# Patient Record
Sex: MALE | Race: WHITE
[De-identification: names, ages, dates, MRNs, and addresses within clinical notes are randomized per-mention and may not be internally consistent; named-entity substitution may affect disease eponyms.]

---

## 2021-09-01 ENCOUNTER — HOSPITAL ENCOUNTER (INPATIENT)
Dept: HOSPITAL 19 - COL.ER | Age: 66
LOS: 9 days | Discharge: HOME HEALTH | DRG: 853 | End: 2021-09-10
Attending: SURGERY | Admitting: SURGERY
Payer: MEDICARE

## 2021-09-01 VITALS — OXYGEN SATURATION: 99 %

## 2021-09-01 VITALS — OXYGEN SATURATION: 84 %

## 2021-09-01 VITALS — OXYGEN SATURATION: 100 %

## 2021-09-01 VITALS — OXYGEN SATURATION: 98 %

## 2021-09-01 VITALS — OXYGEN SATURATION: 97 %

## 2021-09-01 VITALS
SYSTOLIC BLOOD PRESSURE: 91 MMHG | HEART RATE: 90 BPM | TEMPERATURE: 98.1 F | OXYGEN SATURATION: 98 % | DIASTOLIC BLOOD PRESSURE: 52 MMHG

## 2021-09-01 VITALS — SYSTOLIC BLOOD PRESSURE: 88 MMHG | DIASTOLIC BLOOD PRESSURE: 55 MMHG | TEMPERATURE: 98.1 F | HEART RATE: 91 BPM

## 2021-09-01 VITALS — DIASTOLIC BLOOD PRESSURE: 53 MMHG | HEART RATE: 92 BPM | SYSTOLIC BLOOD PRESSURE: 98 MMHG | TEMPERATURE: 98.2 F

## 2021-09-01 VITALS — HEIGHT: 65.98 IN | WEIGHT: 160.94 LBS | BODY MASS INDEX: 25.86 KG/M2

## 2021-09-01 VITALS — SYSTOLIC BLOOD PRESSURE: 88 MMHG | DIASTOLIC BLOOD PRESSURE: 56 MMHG | HEART RATE: 90 BPM | TEMPERATURE: 98.1 F

## 2021-09-01 VITALS — TEMPERATURE: 98.1 F | SYSTOLIC BLOOD PRESSURE: 84 MMHG | HEART RATE: 92 BPM | DIASTOLIC BLOOD PRESSURE: 56 MMHG

## 2021-09-01 VITALS
SYSTOLIC BLOOD PRESSURE: 88 MMHG | DIASTOLIC BLOOD PRESSURE: 56 MMHG | TEMPERATURE: 98.3 F | HEART RATE: 91 BPM | OXYGEN SATURATION: 99 %

## 2021-09-01 VITALS
SYSTOLIC BLOOD PRESSURE: 100 MMHG | OXYGEN SATURATION: 99 % | HEART RATE: 94 BPM | TEMPERATURE: 98 F | DIASTOLIC BLOOD PRESSURE: 54 MMHG

## 2021-09-01 VITALS — SYSTOLIC BLOOD PRESSURE: 89 MMHG | TEMPERATURE: 98.1 F | DIASTOLIC BLOOD PRESSURE: 55 MMHG | HEART RATE: 94 BPM

## 2021-09-01 DIAGNOSIS — K59.04: ICD-10-CM

## 2021-09-01 DIAGNOSIS — R73.9: ICD-10-CM

## 2021-09-01 DIAGNOSIS — R65.20: ICD-10-CM

## 2021-09-01 DIAGNOSIS — D64.9: ICD-10-CM

## 2021-09-01 DIAGNOSIS — E87.6: ICD-10-CM

## 2021-09-01 DIAGNOSIS — R06.6: ICD-10-CM

## 2021-09-01 DIAGNOSIS — K63.1: ICD-10-CM

## 2021-09-01 DIAGNOSIS — C18.7: ICD-10-CM

## 2021-09-01 DIAGNOSIS — A41.9: Primary | ICD-10-CM

## 2021-09-01 DIAGNOSIS — Z20.822: ICD-10-CM

## 2021-09-01 LAB
ALBUMIN SERPL-MCNC: 4.1 GM/DL (ref 3.5–5)
ALP SERPL-CCNC: 65 U/L (ref 50–136)
ALT SERPL-CCNC: 15 U/L (ref 4–49)
ANION GAP SERPL CALC-SCNC: 10 MMOL/L (ref 7–16)
ANION GAP SERPL CALC-SCNC: 6 MMOL/L (ref 7–16)
AST SERPL-CCNC: 22 U/L (ref 15–37)
BASE EXCESS BLDA CALC-SCNC: -2.3 MMOL/L (ref -2–2)
BASOPHILS # BLD: 0 10*3/UL (ref 0–0.2)
BASOPHILS NFR BLD AUTO: 0.2 % (ref 0–2)
BILIRUB SERPL-MCNC: 1.1 MG/DL (ref 0–1)
BUN SERPL-MCNC: 19 MG/DL (ref 9–20)
BUN SERPL-MCNC: 23 MG/DL (ref 9–20)
CALCIUM SERPL-MCNC: 8 MG/DL (ref 8.4–10.2)
CALCIUM SERPL-MCNC: 9.1 MG/DL (ref 8.4–10.2)
CHLORIDE SERPL-SCNC: 106 MMOL/L (ref 98–107)
CHLORIDE SERPL-SCNC: 108 MMOL/L (ref 98–107)
CO2 BLDA-SCNC: 22.5 MMOL/L
CO2 SERPL-SCNC: 22 MMOL/L (ref 22–30)
CO2 SERPL-SCNC: 23 MMOL/L (ref 22–30)
CREAT SERPL-SCNC: 0.77 UMOL/L (ref 0.66–1.25)
CREAT SERPL-SCNC: 0.81 UMOL/L (ref 0.66–1.25)
CRP SERPL-MCNC: 2.6 MG/DL (ref 0–0.9)
EOSINOPHIL # BLD: 0 10*3/UL (ref 0–0.7)
EOSINOPHIL NFR BLD: 0 % (ref 0–4)
ERYTHROCYTE [DISTWIDTH] IN BLOOD BY AUTOMATED COUNT: 14.1 % (ref 11.5–14.5)
ERYTHROCYTE [DISTWIDTH] IN BLOOD BY AUTOMATED COUNT: 14.4 % (ref 11.5–14.5)
GLUCOSE SERPL-MCNC: 163 MG/DL (ref 74–106)
GLUCOSE SERPL-MCNC: 167 MG/DL (ref 74–106)
GLUCOSE UR QL STRIP.AUTO: (no result)
GRANULOCYTES # BLD AUTO: 86.2 % (ref 42.2–75.2)
HCO3 BLDA-SCNC: 21.5 MEQ/L (ref 22–26)
HCT VFR BLD AUTO: 33.9 % (ref 42–52)
HCT VFR BLD AUTO: 40.6 % (ref 42–52)
HGB BLD-MCNC: 11 G/DL (ref 13.5–18)
HGB BLD-MCNC: 13.3 G/DL (ref 13.5–18)
KETONES UR STRIP.AUTO-MCNC: (no result) MG/DL
LIPASE SERPL-CCNC: 27 U/L (ref 23–300)
LYMPHOCYTES # BLD: 0.5 10*3/UL (ref 1.2–3.4)
LYMPHOCYTES NFR BLD MANUAL: 30 % (ref 20–51)
LYMPHOCYTES NFR BLD: 3.2 % (ref 20–51)
MCH RBC QN AUTO: 27 PG (ref 27–31)
MCH RBC QN AUTO: 27 PG (ref 27–31)
MCHC RBC AUTO-ENTMCNC: 32 G/DL (ref 33–37)
MCHC RBC AUTO-ENTMCNC: 33 G/DL (ref 33–37)
MCV RBC AUTO: 82 FL (ref 80–100)
MCV RBC AUTO: 83 FL (ref 80–100)
MONOCYTES # BLD: 1.5 10*3/UL (ref 0.1–0.6)
MONOCYTES NFR BLD AUTO: 10.1 % (ref 1.7–9.3)
NEUTROPHILS # BLD: 12.6 10*3/UL (ref 1.4–6.5)
NEUTS BAND NFR BLD: 6 % (ref 0–10)
NEUTS SEG NFR BLD MANUAL: 64 % (ref 42–75.2)
PCO2 BLDA: 33.7 MMHG (ref 35–45)
PH UR STRIP.AUTO: 5 [PH] (ref 5–8)
PLATELET # BLD AUTO: 205 K/MM3 (ref 130–400)
PLATELET # BLD AUTO: 221 K/MM3 (ref 130–400)
PLATELET BLD QL SMEAR: NORMAL
PMV BLD AUTO: 10.4 FL (ref 7.4–10.4)
PMV BLD AUTO: 10.4 FL (ref 7.4–10.4)
PO2 BLDA: 110.2 MMHG (ref 80–100)
POTASSIUM SERPL-SCNC: 4.1 MMOL/L (ref 3.4–5)
POTASSIUM SERPL-SCNC: 4.4 MMOL/L (ref 3.4–5)
PROT SERPL-MCNC: 7.2 GM/DL (ref 6.4–8.2)
RBC # BLD AUTO: 4.09 M/MM3 (ref 4.2–5.6)
RBC # BLD AUTO: 4.93 M/MM3 (ref 4.2–5.6)
RBC # UR STRIP.AUTO: (no result) /UL
RBC # UR: (no result) /HPF
SAO2 % BLDA: 97.9 % (ref 92–100)
SODIUM SERPL-SCNC: 137 MMOL/L (ref 137–145)
SODIUM SERPL-SCNC: 138 MMOL/L (ref 137–145)
SP GR UR STRIP.AUTO: 1.02 (ref 1–1.03)
SQUAMOUS # URNS: (no result) /HPF
UA DIPSTICK PNL UR STRIP.AUTO: (no result)
URN COLLECT METHOD CLASS: (no result)
UROBILINOGEN UR STRIP.AUTO-MCNC: >=4 MG/DL

## 2021-09-01 PROCEDURE — 0DTN0ZZ RESECTION OF SIGMOID COLON, OPEN APPROACH: ICD-10-PCS | Performed by: SURGERY

## 2021-09-01 PROCEDURE — C1751 CATH, INF, PER/CENT/MIDLINE: HCPCS

## 2021-09-01 PROCEDURE — 0D1N0Z4 BYPASS SIGMOID COLON TO CUTANEOUS, OPEN APPROACH: ICD-10-PCS | Performed by: SURGERY

## 2021-09-01 PROCEDURE — A4314 CATH W/DRAINAGE 2-WAY LATEX: HCPCS

## 2021-09-01 PROCEDURE — 05H633Z INSERTION OF INFUSION DEVICE INTO LEFT SUBCLAVIAN VEIN, PERCUTANEOUS APPROACH: ICD-10-PCS | Performed by: SURGERY

## 2021-09-01 PROCEDURE — A9284 NON-ELECTRONIC SPIROMETER: HCPCS

## 2021-09-01 NOTE — NUR
MR. ALLAN ARRIVE ON UNIT AT 1905 SLEEPING WITH DAUGHTER TO TRANSLATE AS THE
PATIENTS PRIMARY LANGUAGE IS Faroese. PATIENT APPEARED COMFORTABLE AND CALM.
PATIENT WAS ON 5 LPM OXYMASK FOR COMFORT. ON INSPECTION OF ALL INCISIONS AND
DRAINS, THE ABDOMEN HAD ABOUT TWO QUARTER SIZED DRAINAGE, THAT WERE MARKED AND
TIMED. THE MIDLINE INCISION IS COVERED WITH AN ABD PAD THAT IS CLEAN AND
SECURE ON ALL SIDES. PATIENT HAS A 14 FR TREVINO THAT IS DRAINING YELLOW CLEAR
URINE. THE PATIENT HAS A REINALDO DRAIN THAT IS DRAINING SCANT AMOUNT OF BLOOD
TINGED FLUID THAT IS NOT HAVING CLOTTING. ON THE LEFT SUBCLAVIAN THE PATIENT
HAS A TRIPPLE LUMEM CENTRAL LINE. THE COLOSTOMY BAG WAS NOT CHANGED AND HAS
SCANT AMOUNT OF DRAINAGE. PATIENT IS OTHERWISE HEALTHY AND WILL CONTINUE TO
MONITOR

## 2021-09-02 VITALS — OXYGEN SATURATION: 97 %

## 2021-09-02 VITALS — HEART RATE: 88 BPM | DIASTOLIC BLOOD PRESSURE: 46 MMHG | SYSTOLIC BLOOD PRESSURE: 95 MMHG | TEMPERATURE: 98.3 F

## 2021-09-02 VITALS — TEMPERATURE: 98.3 F | HEART RATE: 100 BPM | DIASTOLIC BLOOD PRESSURE: 58 MMHG | SYSTOLIC BLOOD PRESSURE: 102 MMHG

## 2021-09-02 VITALS — TEMPERATURE: 98.3 F | HEART RATE: 90 BPM | SYSTOLIC BLOOD PRESSURE: 106 MMHG | DIASTOLIC BLOOD PRESSURE: 61 MMHG

## 2021-09-02 VITALS — DIASTOLIC BLOOD PRESSURE: 57 MMHG | TEMPERATURE: 98.3 F | HEART RATE: 87 BPM | SYSTOLIC BLOOD PRESSURE: 96 MMHG

## 2021-09-02 VITALS — OXYGEN SATURATION: 98 %

## 2021-09-02 VITALS — OXYGEN SATURATION: 96 %

## 2021-09-02 VITALS — OXYGEN SATURATION: 99 %

## 2021-09-02 VITALS — TEMPERATURE: 98.1 F | HEART RATE: 92 BPM | DIASTOLIC BLOOD PRESSURE: 48 MMHG | SYSTOLIC BLOOD PRESSURE: 96 MMHG

## 2021-09-02 VITALS — OXYGEN SATURATION: 100 %

## 2021-09-02 LAB
ALBUMIN SERPL-MCNC: 2.7 GM/DL (ref 3.5–5)
ALP SERPL-CCNC: 34 U/L (ref 50–136)
ALT SERPL-CCNC: 13 U/L (ref 4–49)
ANION GAP SERPL CALC-SCNC: 4 MMOL/L (ref 7–16)
AST SERPL-CCNC: 19 U/L (ref 15–37)
BILIRUB SERPL-MCNC: 0.9 MG/DL (ref 0–1)
BUN SERPL-MCNC: 18 MG/DL (ref 9–20)
CALCIUM SERPL-MCNC: 7.5 MG/DL (ref 8.4–10.2)
CHLORIDE SERPL-SCNC: 111 MMOL/L (ref 98–107)
CO2 SERPL-SCNC: 25 MMOL/L (ref 22–30)
CREAT SERPL-SCNC: 0.74 UMOL/L (ref 0.66–1.25)
ERYTHROCYTE [DISTWIDTH] IN BLOOD BY AUTOMATED COUNT: 14.2 % (ref 11.5–14.5)
GLUCOSE SERPL-MCNC: 138 MG/DL (ref 74–106)
HCT VFR BLD AUTO: 30.8 % (ref 42–52)
HGB BLD-MCNC: 10 G/DL (ref 13.5–18)
HYPOCHROMIA BLD QL SMEAR: (no result)
LYMPHOCYTES NFR BLD MANUAL: 13 % (ref 20–51)
MCH RBC QN AUTO: 27 PG (ref 27–31)
MCHC RBC AUTO-ENTMCNC: 33 G/DL (ref 33–37)
MCV RBC AUTO: 84 FL (ref 80–100)
METAMYELOCYTES NFR BLD MANUAL: 3 % (ref 0–0)
MONOCYTES NFR BLD: 3 % (ref 1.7–9.3)
NEUTS BAND NFR BLD: 45 % (ref 0–10)
NEUTS SEG NFR BLD MANUAL: 36 % (ref 42–75.2)
PLATELET # BLD AUTO: 183 K/MM3 (ref 130–400)
PLATELET BLD QL SMEAR: NORMAL
PMV BLD AUTO: 10.5 FL (ref 7.4–10.4)
POTASSIUM SERPL-SCNC: 3.9 MMOL/L (ref 3.4–5)
PROT SERPL-MCNC: 5.2 GM/DL (ref 6.4–8.2)
RBC # BLD AUTO: 3.65 M/MM3 (ref 4.2–5.6)
SODIUM SERPL-SCNC: 139 MMOL/L (ref 137–145)

## 2021-09-03 VITALS — OXYGEN SATURATION: 97 %

## 2021-09-03 VITALS — SYSTOLIC BLOOD PRESSURE: 104 MMHG | DIASTOLIC BLOOD PRESSURE: 57 MMHG | HEART RATE: 88 BPM

## 2021-09-03 VITALS — DIASTOLIC BLOOD PRESSURE: 69 MMHG | TEMPERATURE: 99 F | HEART RATE: 95 BPM | SYSTOLIC BLOOD PRESSURE: 123 MMHG

## 2021-09-03 VITALS — SYSTOLIC BLOOD PRESSURE: 125 MMHG | TEMPERATURE: 99.3 F | HEART RATE: 92 BPM | DIASTOLIC BLOOD PRESSURE: 91 MMHG

## 2021-09-03 VITALS — HEART RATE: 90 BPM | SYSTOLIC BLOOD PRESSURE: 105 MMHG | TEMPERATURE: 98.1 F | DIASTOLIC BLOOD PRESSURE: 55 MMHG

## 2021-09-03 VITALS — DIASTOLIC BLOOD PRESSURE: 67 MMHG | HEART RATE: 83 BPM | TEMPERATURE: 98.6 F | SYSTOLIC BLOOD PRESSURE: 151 MMHG

## 2021-09-03 VITALS — DIASTOLIC BLOOD PRESSURE: 73 MMHG | SYSTOLIC BLOOD PRESSURE: 147 MMHG | TEMPERATURE: 98.6 F | HEART RATE: 95 BPM

## 2021-09-03 VITALS — OXYGEN SATURATION: 99 %

## 2021-09-03 VITALS — SYSTOLIC BLOOD PRESSURE: 119 MMHG | DIASTOLIC BLOOD PRESSURE: 82 MMHG | TEMPERATURE: 99.4 F | HEART RATE: 99 BPM

## 2021-09-03 VITALS — OXYGEN SATURATION: 91 %

## 2021-09-03 VITALS — OXYGEN SATURATION: 93 %

## 2021-09-03 VITALS — OXYGEN SATURATION: 94 %

## 2021-09-03 VITALS — OXYGEN SATURATION: 95 %

## 2021-09-03 VITALS — OXYGEN SATURATION: 92 %

## 2021-09-03 VITALS — OXYGEN SATURATION: 98 %

## 2021-09-03 VITALS — OXYGEN SATURATION: 96 %

## 2021-09-03 VITALS — OXYGEN SATURATION: 100 %

## 2021-09-03 VITALS — TEMPERATURE: 99.4 F

## 2021-09-03 LAB
ALBUMIN SERPL-MCNC: 2.9 GM/DL (ref 3.5–5)
ALP SERPL-CCNC: 41 U/L (ref 50–136)
ALT SERPL-CCNC: 13 U/L (ref 4–49)
ANION GAP SERPL CALC-SCNC: 5 MMOL/L (ref 7–16)
AST SERPL-CCNC: 24 U/L (ref 15–37)
BASOPHILS # BLD: 0 10*3/UL (ref 0–0.2)
BASOPHILS NFR BLD AUTO: 0.1 % (ref 0–2)
BILIRUB SERPL-MCNC: 0.2 MG/DL (ref 0–1)
BUN SERPL-MCNC: 21 MG/DL (ref 9–20)
CALCIUM SERPL-MCNC: 8.1 MG/DL (ref 8.4–10.2)
CHLORIDE SERPL-SCNC: 108 MMOL/L (ref 98–107)
CO2 SERPL-SCNC: 26 MMOL/L (ref 22–30)
CREAT SERPL-SCNC: 0.78 UMOL/L (ref 0.66–1.25)
EOSINOPHIL # BLD: 0 10*3/UL (ref 0–0.7)
EOSINOPHIL NFR BLD: 0 % (ref 0–4)
ERYTHROCYTE [DISTWIDTH] IN BLOOD BY AUTOMATED COUNT: 14.2 % (ref 11.5–14.5)
GLUCOSE SERPL-MCNC: 112 MG/DL (ref 74–106)
GRANULOCYTES # BLD AUTO: 76 % (ref 42.2–75.2)
HCT VFR BLD AUTO: 30.8 % (ref 42–52)
HGB BLD-MCNC: 10 G/DL (ref 13.5–18)
LYMPHOCYTES # BLD: 1.4 10*3/UL (ref 1.2–3.4)
LYMPHOCYTES NFR BLD: 13.8 % (ref 20–51)
MCH RBC QN AUTO: 27 PG (ref 27–31)
MCHC RBC AUTO-ENTMCNC: 33 G/DL (ref 33–37)
MCV RBC AUTO: 82 FL (ref 80–100)
MONOCYTES # BLD: 1 10*3/UL (ref 0.1–0.6)
MONOCYTES NFR BLD AUTO: 9.6 % (ref 1.7–9.3)
NEUTROPHILS # BLD: 7.7 10*3/UL (ref 1.4–6.5)
PLATELET # BLD AUTO: 214 K/MM3 (ref 130–400)
PMV BLD AUTO: 10.7 FL (ref 7.4–10.4)
POTASSIUM SERPL-SCNC: 4.1 MMOL/L (ref 3.4–5)
PROT SERPL-MCNC: 5.7 GM/DL (ref 6.4–8.2)
RBC # BLD AUTO: 3.74 M/MM3 (ref 4.2–5.6)
SODIUM SERPL-SCNC: 139 MMOL/L (ref 137–145)

## 2021-09-03 NOTE — NUR
Patient alert and oriented, answers questions appropriately.  See assessment.
Abdomen soft, rounded.  Bowel sounds hypoactive x4 quads. +Flatus.  C/o
nausea.  Midline incision with shadow drainage noted to dressing.  REINALDO to RLQ
compressed, serosanguinous drainage noted.  Left colostomy with stoma pink et
protruding, gas noted in bag.  Left subclavian triple lumen catheter in place,
flushes well with good blood return noted.  Spear catheter in place, patent,
draining clear yellow urine.  Epidural catheter in place to mid back, no
drainage at site, secured with tegaderm and foam tape.  Neuros intact to BLE,
no c/o numbness or tingling, pulses palpable.  No c/o at this time.

## 2021-09-03 NOTE — NUR
Awake, alert, oriented x 4, able to verbalize needs, abdominal dressing with
scant amount of previously marked drainage on dressing, REINALDO drain to R abdomen
with dressing c/d/i draining SS drainage, colostomy on L abdomen with dark
brown liquid- 15cc in bag, tinajero draining w/o issue with dependent drainage,
epidural to mid spine with dressing c/d/i with fentanyl infusing per orders,
patient denies numbness or tingling to BLE, moves BLE w/o difficulty,
respirations even and unlabored, skin warm and dry and intact, denies pain,
not tolerating diet at this time, c/o nausea- prn medication given by previous
nurse, will continue to monitor.

## 2021-09-04 VITALS — TEMPERATURE: 98.5 F | DIASTOLIC BLOOD PRESSURE: 58 MMHG | SYSTOLIC BLOOD PRESSURE: 122 MMHG | HEART RATE: 90 BPM

## 2021-09-04 VITALS — HEART RATE: 79 BPM | SYSTOLIC BLOOD PRESSURE: 138 MMHG | TEMPERATURE: 98.5 F | DIASTOLIC BLOOD PRESSURE: 61 MMHG

## 2021-09-04 VITALS — SYSTOLIC BLOOD PRESSURE: 107 MMHG | TEMPERATURE: 98.3 F | HEART RATE: 92 BPM | DIASTOLIC BLOOD PRESSURE: 45 MMHG

## 2021-09-04 VITALS — DIASTOLIC BLOOD PRESSURE: 67 MMHG | TEMPERATURE: 98.1 F | HEART RATE: 96 BPM | SYSTOLIC BLOOD PRESSURE: 122 MMHG

## 2021-09-04 VITALS — HEART RATE: 84 BPM | SYSTOLIC BLOOD PRESSURE: 120 MMHG | DIASTOLIC BLOOD PRESSURE: 58 MMHG | TEMPERATURE: 97.3 F

## 2021-09-04 VITALS — SYSTOLIC BLOOD PRESSURE: 113 MMHG | HEART RATE: 101 BPM | TEMPERATURE: 99 F | DIASTOLIC BLOOD PRESSURE: 54 MMHG

## 2021-09-04 VITALS — TEMPERATURE: 97.3 F

## 2021-09-04 LAB
ANION GAP SERPL CALC-SCNC: 6 MMOL/L (ref 7–16)
BASOPHILS # BLD: 0 10*3/UL (ref 0–0.2)
BASOPHILS NFR BLD AUTO: 0.2 % (ref 0–2)
BUN SERPL-MCNC: 21 MG/DL (ref 9–20)
CALCIUM SERPL-MCNC: 8 MG/DL (ref 8.4–10.2)
CHLORIDE SERPL-SCNC: 103 MMOL/L (ref 98–107)
CO2 SERPL-SCNC: 27 MMOL/L (ref 22–30)
CREAT SERPL-SCNC: 0.74 UMOL/L (ref 0.66–1.25)
EOSINOPHIL # BLD: 0 10*3/UL (ref 0–0.7)
EOSINOPHIL NFR BLD: 0.2 % (ref 0–4)
ERYTHROCYTE [DISTWIDTH] IN BLOOD BY AUTOMATED COUNT: 14 % (ref 11.5–14.5)
GLUCOSE SERPL-MCNC: 124 MG/DL (ref 74–106)
GRANULOCYTES # BLD AUTO: 79.4 % (ref 42.2–75.2)
HCT VFR BLD AUTO: 35.6 % (ref 42–52)
HGB BLD-MCNC: 11.5 G/DL (ref 13.5–18)
LYMPHOCYTES # BLD: 1.5 10*3/UL (ref 1.2–3.4)
LYMPHOCYTES NFR BLD: 11.5 % (ref 20–51)
MCH RBC QN AUTO: 27 PG (ref 27–31)
MCHC RBC AUTO-ENTMCNC: 32 G/DL (ref 33–37)
MCV RBC AUTO: 84 FL (ref 80–100)
MONOCYTES # BLD: 1 10*3/UL (ref 0.1–0.6)
MONOCYTES NFR BLD AUTO: 7.8 % (ref 1.7–9.3)
NEUTROPHILS # BLD: 10.1 10*3/UL (ref 1.4–6.5)
PLATELET # BLD AUTO: 279 K/MM3 (ref 130–400)
PMV BLD AUTO: 10.8 FL (ref 7.4–10.4)
POTASSIUM SERPL-SCNC: 3.6 MMOL/L (ref 3.4–5)
RBC # BLD AUTO: 4.25 M/MM3 (ref 4.2–5.6)
SODIUM SERPL-SCNC: 136 MMOL/L (ref 137–145)

## 2021-09-04 NOTE — NUR
Pt doing well.  Pt stated that he did have a little more pain this afternoon,
most likely from increase in activity today.  Informed anesthesia earlier that
epidural appeared to be leaking.  Pt does report that he does feel as if he
gets pain relief when he pushes the button

## 2021-09-04 NOTE — NUR
Plans to return home with Daughter Aubree Jean locally [phone].
SW met with patient and daughter in room. Patient does not speak english.
Romainian- Daughter Translated and spoke with patient and SW. DTR reports that
the patient resides with her and her  and the patients wife in the DTRs
home. DTR reprots that the patient has a son in [place] Francesco Aguila [phone]
[phone], who speaks english as well and is another EMR contact. Patient's PCP
is Dr. Stinson at [facility]. DTR denies patient has a DPOA,
any DME use, transport concerns, or medications issues. Patient obtains
medications from [facility] without difficulty. Patient reports that he is
currently in pain, 5/10 and is wanting to not have the catheter. Patient
reports wanting pain management. Dtr reports that the other question that she
has are for the Doctore and want proper education on how to keep father clean.
Eudcated on services with case management offered supports.

## 2021-09-04 NOTE — NUR
PT RESTING IN BED. SEE MAR FOR PAIN MED GIVEN. CHANGED ABD DRSG WITH GAUZE.
IRRIGATED COLOSTOMY WITH 100CC TAP H20 AND CLEAR RETURN NOTED. ABLE TO STEVE
MIRALAX IN APPLE JUICE. HAS CONSTANT HICCOUGHS.  USING IS UP TO 1000CC X5.
REINALDO DRAIN INTACT WITH VERY LITTLE DRG NOTED. PT VERY APPRECIATIVE OF CARES.  NO
OTHER NEEDS AT THIS TIME.

## 2021-09-04 NOTE — NUR
Pt resting in bed upon entering room.  He does have the hiccups which he
stated he gets after drinking anything.  He does spit up a little which is
clear.  Dressing to midline changed to airstrip, minimal drainage noted.
Dressing to REINALDO is CDI.  Burped ostomy bag, scant amount of liquid.  Irrigated
at this time with no return of stool.  Epidural is leaking, mid level
notified, will notify anesthesia.  Assisted pt to the chair, he did very well
with minimal complaints.  Pt made the comment that he is paying for what
happened, seemed very discouraged.  Educated to take it a day at a time and
take small steps.  Call light within reach

## 2021-09-04 NOTE — NUR
Anesthesia in to see patient, epidural assessed and dressing reinforced.  Pt
having complaints of pain to his right side, PRN pain medication given

## 2021-09-04 NOTE — NUR
Patient states he is "feeling better", no bowel movement noted, House
supervisor irrigated colostomy as ordered w/o results, patient is passing gas
through colostomy, no c/o at this time, will continue to monitor.

## 2021-09-04 NOTE — NUR
Patient attempting to get comfortable in bed, assisted with pillows, denies
pain, epidural dressing c/d/i w/o issue, respirations even and unlabored @16
per minute, denies numbness/tingling to ble, dressing to abdomen and REINALDO
drained changed per order- tolerated well, tinajero draining w/o issue, colostomy
bag intact. Denies further needs at this time.

## 2021-09-05 VITALS — HEART RATE: 87 BPM | SYSTOLIC BLOOD PRESSURE: 114 MMHG | DIASTOLIC BLOOD PRESSURE: 52 MMHG | TEMPERATURE: 98.1 F

## 2021-09-05 VITALS — HEART RATE: 93 BPM | TEMPERATURE: 98.2 F | DIASTOLIC BLOOD PRESSURE: 52 MMHG | SYSTOLIC BLOOD PRESSURE: 127 MMHG

## 2021-09-05 VITALS — HEART RATE: 77 BPM | SYSTOLIC BLOOD PRESSURE: 103 MMHG | TEMPERATURE: 99 F | DIASTOLIC BLOOD PRESSURE: 44 MMHG

## 2021-09-05 VITALS — SYSTOLIC BLOOD PRESSURE: 125 MMHG | HEART RATE: 86 BPM | TEMPERATURE: 98.4 F | DIASTOLIC BLOOD PRESSURE: 58 MMHG

## 2021-09-05 VITALS — DIASTOLIC BLOOD PRESSURE: 42 MMHG | HEART RATE: 97 BPM | SYSTOLIC BLOOD PRESSURE: 99 MMHG

## 2021-09-05 LAB
ALBUMIN SERPL-MCNC: 2.8 GM/DL (ref 3.5–5)
ALP SERPL-CCNC: 41 U/L (ref 50–136)
ALT SERPL-CCNC: 18 U/L (ref 4–49)
ANION GAP SERPL CALC-SCNC: 4 MMOL/L (ref 7–16)
AST SERPL-CCNC: 29 U/L (ref 15–37)
BASOPHILS # BLD: 0 10*3/UL (ref 0–0.2)
BASOPHILS NFR BLD AUTO: 0.1 % (ref 0–2)
BILIRUB SERPL-MCNC: 0.5 MG/DL (ref 0–1)
BUN SERPL-MCNC: 21 MG/DL (ref 9–20)
CALCIUM SERPL-MCNC: 7.7 MG/DL (ref 8.4–10.2)
CHLORIDE SERPL-SCNC: 104 MMOL/L (ref 98–107)
CO2 SERPL-SCNC: 27 MMOL/L (ref 22–30)
CREAT SERPL-SCNC: 0.69 UMOL/L (ref 0.66–1.25)
EOSINOPHIL # BLD: 0.1 10*3/UL (ref 0–0.7)
EOSINOPHIL NFR BLD: 1.3 % (ref 0–4)
ERYTHROCYTE [DISTWIDTH] IN BLOOD BY AUTOMATED COUNT: 13.8 % (ref 11.5–14.5)
GLUCOSE SERPL-MCNC: 116 MG/DL (ref 74–106)
GRANULOCYTES # BLD AUTO: 74.1 % (ref 42.2–75.2)
HCT VFR BLD AUTO: 30 % (ref 42–52)
HGB BLD-MCNC: 9.8 G/DL (ref 13.5–18)
LYMPHOCYTES # BLD: 1.4 10*3/UL (ref 1.2–3.4)
LYMPHOCYTES NFR BLD: 14.2 % (ref 20–51)
MAGNESIUM SERPL-MCNC: 2.1 MG/DL (ref 1.6–2.3)
MCH RBC QN AUTO: 27 PG (ref 27–31)
MCHC RBC AUTO-ENTMCNC: 33 G/DL (ref 33–37)
MCV RBC AUTO: 82 FL (ref 80–100)
MONOCYTES # BLD: 0.9 10*3/UL (ref 0.1–0.6)
MONOCYTES NFR BLD AUTO: 9.7 % (ref 1.7–9.3)
NEUTROPHILS # BLD: 7.1 10*3/UL (ref 1.4–6.5)
PLATELET # BLD AUTO: 258 K/MM3 (ref 130–400)
PMV BLD AUTO: 9.9 FL (ref 7.4–10.4)
POTASSIUM SERPL-SCNC: 3.3 MMOL/L (ref 3.4–5)
PROT SERPL-MCNC: 5.4 GM/DL (ref 6.4–8.2)
RBC # BLD AUTO: 3.67 M/MM3 (ref 4.2–5.6)
SODIUM SERPL-SCNC: 136 MMOL/L (ref 137–145)

## 2021-09-05 NOTE — NUR
Pt resting in bed upon entering room.  He is having the hiccups, but appears
more due to being full.  No output from ostomy and hypo to active bowel
sounds.  Dressing to midline changed.  No drainage noted, airstrip applied.
REINALDO to bulb suction, drsg CDI.  Ostomy irrigated per order, no output noted
during irrigation.  Epidural removed per order by BETHANY Chase.  Pt then
assisted up to the chair.  He did well with a one assist.

## 2021-09-05 NOTE — NUR
Discussed CT with Dr Briones.  Pt is sleeping a lot more today, but does wake
easily.  Daughter updated and just recently left to go home

## 2021-09-05 NOTE — NUR
Pt eating some yogurt at this time, refusing anything to drink.  States pain
is okay at this time.  Call light within reach

## 2021-09-05 NOTE — NUR
PT HAS SLEPT FAIRLY WELL THROUGH THE NIGHT. PAIN CONTROLLED WITH PERCOCET
TONIGHT. EPIDURAL IN PLACE. LOVENOX ON HOLD. MINIMAL DRAIAGE FROM COLOSTOMY
AND REINALDO DRAIN THIS SHIFT. TREVINO DRAINING FREELY. CALL LIGHT IN REACH.

## 2021-09-05 NOTE — NUR
Pt daughter has arrived.  Updated her on pt condition.  Daughter is concerned
about the amount of radiation her father is getting.  Assured her that tests
are ordered on a day by day basis and is only done if necessary.  All
questions answered.  Pt resting with his eyes closed, even non labored
breathing

## 2021-09-05 NOTE — NUR
GAVE ZOFRAN FOR C/O MILD NAUSEA.  STILL HAVING HICCOUGH WITH REFLUX. HOB
ELEVATED. PT VOIDED 250CC CLEAR YELLOW URINE PER URINAL.

## 2021-09-05 NOTE — NUR
NOTED FLATUS IN COLOSTOMY BAG.  IRRIGATED WITH 100CC TAP H20. STEVE WELL.
IMMEDIATE RETURN OF FLUID AND MINIMAL GAS. ZOFRAN HELPED NAUSEA.

## 2021-09-05 NOTE — NUR
Assisted pt back to bed, pt reported feeling tired, wanting to rest.  Spear
catheter removed.  Pt had complaints of pain, PRN pain medication given.  Pt
given some juice as he stated he was getting thirsty.  Once pt back in bed, pt
appeared very tired and did fall asleep quickly.  He did wake easily for VS,
but then quickly fell back asleep.  Will continue to monitor

## 2021-09-06 VITALS — TEMPERATURE: 98.5 F | DIASTOLIC BLOOD PRESSURE: 48 MMHG | SYSTOLIC BLOOD PRESSURE: 109 MMHG | HEART RATE: 86 BPM

## 2021-09-06 VITALS — HEART RATE: 88 BPM | SYSTOLIC BLOOD PRESSURE: 118 MMHG | DIASTOLIC BLOOD PRESSURE: 84 MMHG | TEMPERATURE: 98.2 F

## 2021-09-06 VITALS — DIASTOLIC BLOOD PRESSURE: 66 MMHG | TEMPERATURE: 98.2 F | SYSTOLIC BLOOD PRESSURE: 131 MMHG | HEART RATE: 76 BPM

## 2021-09-06 VITALS — SYSTOLIC BLOOD PRESSURE: 116 MMHG | HEART RATE: 83 BPM | DIASTOLIC BLOOD PRESSURE: 81 MMHG | TEMPERATURE: 97.4 F

## 2021-09-06 VITALS — DIASTOLIC BLOOD PRESSURE: 55 MMHG | TEMPERATURE: 99.2 F | HEART RATE: 80 BPM | SYSTOLIC BLOOD PRESSURE: 117 MMHG

## 2021-09-06 VITALS — DIASTOLIC BLOOD PRESSURE: 49 MMHG | HEART RATE: 94 BPM | TEMPERATURE: 98.5 F | SYSTOLIC BLOOD PRESSURE: 132 MMHG

## 2021-09-06 VITALS — DIASTOLIC BLOOD PRESSURE: 57 MMHG | HEART RATE: 84 BPM | TEMPERATURE: 97.6 F | SYSTOLIC BLOOD PRESSURE: 112 MMHG

## 2021-09-06 LAB
ANION GAP SERPL CALC-SCNC: 5 MMOL/L (ref 7–16)
BASOPHILS # BLD: 0 10*3/UL (ref 0–0.2)
BASOPHILS NFR BLD AUTO: 0.3 % (ref 0–2)
BUN SERPL-MCNC: 14 MG/DL (ref 9–20)
CALCIUM SERPL-MCNC: 7.9 MG/DL (ref 8.4–10.2)
CHLORIDE SERPL-SCNC: 104 MMOL/L (ref 98–107)
CO2 SERPL-SCNC: 27 MMOL/L (ref 22–30)
CREAT SERPL-SCNC: 0.61 UMOL/L (ref 0.66–1.25)
EOSINOPHIL # BLD: 0.2 10*3/UL (ref 0–0.7)
EOSINOPHIL NFR BLD: 2.3 % (ref 0–4)
ERYTHROCYTE [DISTWIDTH] IN BLOOD BY AUTOMATED COUNT: 14 % (ref 11.5–14.5)
GLUCOSE SERPL-MCNC: 94 MG/DL (ref 74–106)
GRANULOCYTES # BLD AUTO: 61.5 % (ref 42.2–75.2)
HCT VFR BLD AUTO: 30.4 % (ref 42–52)
HGB BLD-MCNC: 9.9 G/DL (ref 13.5–18)
LYMPHOCYTES # BLD: 2.3 10*3/UL (ref 1.2–3.4)
LYMPHOCYTES NFR BLD: 23.4 % (ref 20–51)
MCH RBC QN AUTO: 26 PG (ref 27–31)
MCHC RBC AUTO-ENTMCNC: 33 G/DL (ref 33–37)
MCV RBC AUTO: 81 FL (ref 80–100)
MONOCYTES # BLD: 1.1 10*3/UL (ref 0.1–0.6)
MONOCYTES NFR BLD AUTO: 11.4 % (ref 1.7–9.3)
NEUTROPHILS # BLD: 5.9 10*3/UL (ref 1.4–6.5)
PLATELET # BLD AUTO: 301 K/MM3 (ref 130–400)
PMV BLD AUTO: 9.8 FL (ref 7.4–10.4)
POTASSIUM SERPL-SCNC: 3.6 MMOL/L (ref 3.4–5)
RBC # BLD AUTO: 3.76 M/MM3 (ref 4.2–5.6)
SODIUM SERPL-SCNC: 136 MMOL/L (ref 137–145)

## 2021-09-06 NOTE — NUR
Informed Dr Briones about the output from the REINALDO being a thicker consistency and
more white in color.  Dr Briones also aware that pt is not taking in much oral
intake.  He does better with pudding and yogurt consistency.  Pt doing well
sitting up in the chair and does appear in better spirits today than
yesterday.

## 2021-09-06 NOTE — NUR
IRRIGATED COLOSTOMY WITH RETURN OF BROWN LIQUID. DID CHANGE WAFER AND BAG AT
THIS TIME. CHANGED DRSG TO MIDLINE D/T DRAINAGE, NOTED 4 TELFA RISHABH IN PLACE
WITH STAPLES NOTED. PT TAKES HS MEDS WITHOUT PROBLEM. DRANK 120CC OF CRANBERRY
JUICE W/MIRALAX. LEFT TRIPLE LUMEN CENTRAL LINE PATENT, LUMENS FLUSHED.

## 2021-09-06 NOTE — NUR
PT CONTINUES WITH HICCOUGHS AND BELCHING REFLUX. NOTED AIR IN COLOSTOMY BAG.
STOMA CONTINUES TO BE PINK.

## 2021-09-06 NOTE — NUR
Assisted pt up to the chair.  He did well with one assist.  Pt is starting to
have liquid output from his ostomy.  REINALDO output is thick and whitish yellow,
had to strip tubing to get it cleared out.  Pt stated that he is okay and does
not need pain medication.

## 2021-09-06 NOTE — NUR
PT HAS BEEN SLEEPING WELL. REPOSITIONS SELF FREQ FOR COMFORT. NO DRAINAGE
NOTED FROM COLOSTOMY EXCEPT FOR OLD BLOODY DRG.

## 2021-09-06 NOTE — NUR
Pt only ate part of a vanilla yogurt and a couple bites of applesauce all day.
 He is not able to drink any liquids as he feels it makes him feel sick.  Pt
is now having liquid output from his ostomy.  Worked with daughter some on
ostomy care.  Pt did do well transferring to the chair, but it weak.  Pain is
tolerable.  No needs at this time, call light within reach

## 2021-09-07 VITALS — HEART RATE: 79 BPM | DIASTOLIC BLOOD PRESSURE: 59 MMHG | SYSTOLIC BLOOD PRESSURE: 128 MMHG | TEMPERATURE: 98.3 F

## 2021-09-07 VITALS — SYSTOLIC BLOOD PRESSURE: 139 MMHG | DIASTOLIC BLOOD PRESSURE: 59 MMHG | TEMPERATURE: 98.4 F | HEART RATE: 75 BPM

## 2021-09-07 VITALS — TEMPERATURE: 99.1 F | SYSTOLIC BLOOD PRESSURE: 94 MMHG | DIASTOLIC BLOOD PRESSURE: 56 MMHG | HEART RATE: 91 BPM

## 2021-09-07 VITALS — HEART RATE: 82 BPM | SYSTOLIC BLOOD PRESSURE: 113 MMHG | TEMPERATURE: 99.2 F | DIASTOLIC BLOOD PRESSURE: 54 MMHG

## 2021-09-07 VITALS — DIASTOLIC BLOOD PRESSURE: 60 MMHG | TEMPERATURE: 98.4 F | HEART RATE: 81 BPM | SYSTOLIC BLOOD PRESSURE: 116 MMHG

## 2021-09-07 VITALS — SYSTOLIC BLOOD PRESSURE: 120 MMHG | HEART RATE: 86 BPM | TEMPERATURE: 97.8 F | DIASTOLIC BLOOD PRESSURE: 51 MMHG

## 2021-09-07 LAB
ANION GAP SERPL CALC-SCNC: 5 MMOL/L (ref 7–16)
BUN SERPL-MCNC: 12 MG/DL (ref 9–20)
CALCIUM SERPL-MCNC: 8.2 MG/DL (ref 8.4–10.2)
CHLORIDE SERPL-SCNC: 105 MMOL/L (ref 98–107)
CO2 SERPL-SCNC: 26 MMOL/L (ref 22–30)
CREAT SERPL-SCNC: 0.6 UMOL/L (ref 0.66–1.25)
EOSINOPHIL NFR BLD: 3 % (ref 0–4)
ERYTHROCYTE [DISTWIDTH] IN BLOOD BY AUTOMATED COUNT: 14 % (ref 11.5–14.5)
GLUCOSE SERPL-MCNC: 99 MG/DL (ref 74–106)
HCT VFR BLD AUTO: 32.2 % (ref 42–52)
HGB BLD-MCNC: 10.6 G/DL (ref 13.5–18)
LYMPHOCYTES NFR BLD MANUAL: 19 % (ref 20–51)
MCH RBC QN AUTO: 26 PG (ref 27–31)
MCHC RBC AUTO-ENTMCNC: 33 G/DL (ref 33–37)
MCV RBC AUTO: 80 FL (ref 80–100)
MONOCYTES NFR BLD: 10 % (ref 1.7–9.3)
NEUTS BAND NFR BLD: 11 % (ref 0–10)
NEUTS SEG NFR BLD MANUAL: 57 % (ref 42–75.2)
PLATELET # BLD AUTO: 320 K/MM3 (ref 130–400)
PLATELET BLD QL SMEAR: NORMAL
PMV BLD AUTO: 9.8 FL (ref 7.4–10.4)
POTASSIUM SERPL-SCNC: 3.9 MMOL/L (ref 3.4–5)
RBC # BLD AUTO: 4.02 M/MM3 (ref 4.2–5.6)
SODIUM SERPL-SCNC: 136 MMOL/L (ref 137–145)

## 2021-09-07 NOTE — NUR
educated patient on the recommendation of HH. Patient agrees to
this but states that his daughter needs to pick the facility. Attempt to
contact the patients daughter and was unsuccessful. Message left for her that
i need to speak with her about HH agencies. Nursing notified to contact me
with the patients daughter arrives.

## 2021-09-07 NOTE — NUR
PT IN BED. TAKES HS MEDS WITHOUT PROBLEM. IRRIGATED COLOSTOMY WITH SOAP SUDS
WATER WITH RED AWA CATHETER, RETURN OF 300CC OF BROWN SEDIMENT FILLED STOOL.
CHANGED OSTOMY APPLIANCE AND BAG AT THIS TIME. REMOVED REINALDO DRAIN PER DR ORDER,
PLACED 4X4 GAUZE OVER SITE. REMOVED 4 RISHABH FROM MIDLINE INCISION. STAPLES
INTACT. DID PLACE STERI STRIPS OVER OLD WICK SITES AS THERE WAS SOME GAPPING
NOTED. [place] DRSG PLACED OVER MIDLINE. VOIDING PER URINAL WITHOUT
PROBLEM. HAS LEFT TLC WITH IVF INFUSING WITHOUT REDNESS OR SWELLING, GOOD
BLOOD RETURN NOTED FROM ALL 3 LUMENS. PT TURNING SIDE TO SIDE ON HIS OWN.
MINIMAL HICCUPS NOTED.

## 2021-09-07 NOTE — NUR
Patient is getting around better today. He has some pain to his abdomen with
coughing and movement. He did not want to take pills most the day due to
getting hiccups every time he eats or drinks. They only last for a few minutes
afterwards and than go away. He stated he has had problems with that in the
past. No complaints of nausea. Discussed with about how to care for colostomy
and he just shook his hand at me, saying no. He has no interest in caring for
his colostomy on his own. Discussed this with the family to make them aware
someone will need to help him and the daughter said her  will be the
one to help. She asked if he can come up on the day he discharges to see how
to change it. Spoke with supervisor Renee and she that would be ok. No other
changes at this time. Call light within reach.

## 2021-09-07 NOTE — NUR
Patients daughter educated on the need for HH.  provided
Medicare.gov list of agencies that offer HH services in the area. Patient
lives at home with her wife, daughter, son in law and grandson. Patients son
in law works from home and his daughter works at [employer], but with her work
schedule is flexible. Daughter is going to review the list of HH agencies with
her family and call to notify the nurse of their choice.

## 2021-09-07 NOTE — NUR
Attempted to irrigate colostomy as ordered, did not get stool returned. Just
liquid. Patient stated he feels full all the time. dressing to ostomy site
intact. No other changes at this time. Call light within reach.

## 2021-09-08 VITALS — TEMPERATURE: 99.4 F | HEART RATE: 79 BPM | DIASTOLIC BLOOD PRESSURE: 44 MMHG | SYSTOLIC BLOOD PRESSURE: 101 MMHG

## 2021-09-08 VITALS — SYSTOLIC BLOOD PRESSURE: 106 MMHG | HEART RATE: 86 BPM | TEMPERATURE: 98.2 F | DIASTOLIC BLOOD PRESSURE: 47 MMHG

## 2021-09-08 VITALS — TEMPERATURE: 99 F | SYSTOLIC BLOOD PRESSURE: 96 MMHG | DIASTOLIC BLOOD PRESSURE: 48 MMHG | HEART RATE: 92 BPM

## 2021-09-08 VITALS — HEART RATE: 87 BPM | DIASTOLIC BLOOD PRESSURE: 52 MMHG | SYSTOLIC BLOOD PRESSURE: 98 MMHG | TEMPERATURE: 98.7 F

## 2021-09-08 LAB
ANION GAP SERPL CALC-SCNC: 7 MMOL/L (ref 7–16)
BASOPHILS # BLD: 0 10*3/UL (ref 0–0.2)
BASOPHILS NFR BLD AUTO: 0.3 % (ref 0–2)
BUN SERPL-MCNC: 15 MG/DL (ref 9–20)
CALCIUM SERPL-MCNC: 8.2 MG/DL (ref 8.4–10.2)
CHLORIDE SERPL-SCNC: 103 MMOL/L (ref 98–107)
CO2 SERPL-SCNC: 26 MMOL/L (ref 22–30)
CREAT SERPL-SCNC: 0.61 UMOL/L (ref 0.66–1.25)
EOSINOPHIL # BLD: 0.2 10*3/UL (ref 0–0.7)
EOSINOPHIL NFR BLD: 2.3 % (ref 0–4)
ERYTHROCYTE [DISTWIDTH] IN BLOOD BY AUTOMATED COUNT: 14.2 % (ref 11.5–14.5)
GLUCOSE SERPL-MCNC: 106 MG/DL (ref 74–106)
GRANULOCYTES # BLD AUTO: 66.5 % (ref 42.2–75.2)
HCT VFR BLD AUTO: 32.2 % (ref 42–52)
HGB BLD-MCNC: 10.3 G/DL (ref 13.5–18)
LYMPHOCYTES # BLD: 1.7 10*3/UL (ref 1.2–3.4)
LYMPHOCYTES NFR BLD: 18.2 % (ref 20–51)
MCH RBC QN AUTO: 26 PG (ref 27–31)
MCHC RBC AUTO-ENTMCNC: 32 G/DL (ref 33–37)
MCV RBC AUTO: 83 FL (ref 80–100)
MONOCYTES # BLD: 1.1 10*3/UL (ref 0.1–0.6)
MONOCYTES NFR BLD AUTO: 11.5 % (ref 1.7–9.3)
NEUTROPHILS # BLD: 6.2 10*3/UL (ref 1.4–6.5)
PLATELET # BLD AUTO: 352 K/MM3 (ref 130–400)
PMV BLD AUTO: 9.8 FL (ref 7.4–10.4)
POTASSIUM SERPL-SCNC: 3.4 MMOL/L (ref 3.4–5)
RBC # BLD AUTO: 3.9 M/MM3 (ref 4.2–5.6)
SODIUM SERPL-SCNC: 135 MMOL/L (ref 137–145)

## 2021-09-08 NOTE — NUR
Patient has been doing better today. He is starting to have more liquid stool
returned and is passing flatus. Irrigated colostomy as ordered, brown liquid
returned. He is eating better and denies nausea. He is tolerating intake
better. He is getting around with a walker much better today. Patient
tolerated the irrigation without issues. No other changes at this time. Call
light wihtin reach.

## 2021-09-08 NOTE — NUR
Rhoda had pt complete medicare about you rights. IM completed 9/8 11:29 am.
Rhoda put in his chart and gave copy to RHODA palmer.

## 2021-09-08 NOTE — NUR
Sitting up in chair watching TV visiting with daughter who helped with
translation. Tolerated supper tray-ate 100%. Denies pain/nausea/shortness of
breath. VS stable. Dressing to midline abdomen with small amount of old
drainate. Colostomy to left side with scant amount of brown liquid stool. Plan
of care discussed for this shift to include medications/enema/calling for
questions/concerns. Verbalizes understanding.  WIll monitor.

## 2021-09-09 VITALS — DIASTOLIC BLOOD PRESSURE: 53 MMHG | SYSTOLIC BLOOD PRESSURE: 118 MMHG | TEMPERATURE: 98.4 F | HEART RATE: 72 BPM

## 2021-09-09 VITALS — HEART RATE: 84 BPM | TEMPERATURE: 97.6 F | DIASTOLIC BLOOD PRESSURE: 62 MMHG | SYSTOLIC BLOOD PRESSURE: 120 MMHG

## 2021-09-09 VITALS — SYSTOLIC BLOOD PRESSURE: 113 MMHG | TEMPERATURE: 98.6 F | HEART RATE: 71 BPM | DIASTOLIC BLOOD PRESSURE: 44 MMHG

## 2021-09-09 VITALS — DIASTOLIC BLOOD PRESSURE: 51 MMHG | HEART RATE: 75 BPM | SYSTOLIC BLOOD PRESSURE: 110 MMHG | TEMPERATURE: 98 F

## 2021-09-09 VITALS — DIASTOLIC BLOOD PRESSURE: 62 MMHG | HEART RATE: 74 BPM | TEMPERATURE: 98.2 F | SYSTOLIC BLOOD PRESSURE: 120 MMHG

## 2021-09-09 VITALS — DIASTOLIC BLOOD PRESSURE: 63 MMHG | HEART RATE: 76 BPM | SYSTOLIC BLOOD PRESSURE: 126 MMHG | TEMPERATURE: 98.4 F

## 2021-09-09 VITALS — TEMPERATURE: 98.3 F | HEART RATE: 92 BPM | DIASTOLIC BLOOD PRESSURE: 46 MMHG | SYSTOLIC BLOOD PRESSURE: 115 MMHG

## 2021-09-09 LAB
ANION GAP SERPL CALC-SCNC: 6 MMOL/L (ref 7–16)
BASOPHILS # BLD: 0 10*3/UL (ref 0–0.2)
BASOPHILS NFR BLD AUTO: 0.3 % (ref 0–2)
BUN SERPL-MCNC: 16 MG/DL (ref 9–20)
CALCIUM SERPL-MCNC: 8.3 MG/DL (ref 8.4–10.2)
CHLORIDE SERPL-SCNC: 103 MMOL/L (ref 98–107)
CO2 SERPL-SCNC: 27 MMOL/L (ref 22–30)
CREAT SERPL-SCNC: 0.73 UMOL/L (ref 0.66–1.25)
EOSINOPHIL # BLD: 0.2 10*3/UL (ref 0–0.7)
EOSINOPHIL NFR BLD: 1.7 % (ref 0–4)
ERYTHROCYTE [DISTWIDTH] IN BLOOD BY AUTOMATED COUNT: 14.3 % (ref 11.5–14.5)
GLUCOSE SERPL-MCNC: 104 MG/DL (ref 74–106)
GRANULOCYTES # BLD AUTO: 64.9 % (ref 42.2–75.2)
HCT VFR BLD AUTO: 32.3 % (ref 42–52)
HGB BLD-MCNC: 10.5 G/DL (ref 13.5–18)
LYMPHOCYTES # BLD: 2.4 10*3/UL (ref 1.2–3.4)
LYMPHOCYTES NFR BLD: 22.5 % (ref 20–51)
MCH RBC QN AUTO: 27 PG (ref 27–31)
MCHC RBC AUTO-ENTMCNC: 33 G/DL (ref 33–37)
MCV RBC AUTO: 82 FL (ref 80–100)
MONOCYTES # BLD: 1 10*3/UL (ref 0.1–0.6)
MONOCYTES NFR BLD AUTO: 9.8 % (ref 1.7–9.3)
NEUTROPHILS # BLD: 6.8 10*3/UL (ref 1.4–6.5)
PLATELET # BLD AUTO: 390 K/MM3 (ref 130–400)
PMV BLD AUTO: 9.8 FL (ref 7.4–10.4)
POTASSIUM SERPL-SCNC: 3.3 MMOL/L (ref 3.4–5)
RBC # BLD AUTO: 3.95 M/MM3 (ref 4.2–5.6)
SODIUM SERPL-SCNC: 136 MMOL/L (ref 137–145)

## 2021-09-09 NOTE — NUR
Patient has been doing ok today. He has some pain ealier in the shift, tylenol
given by the student nurse and patient has been doing well since. Denies
nausea today. He has been eating better. His liquid stool output has increased
today. Student nurse was able to irrigate the colostomy as ordered with soap
suds and water. His daughter was her this afternoon and asked several
questions about discharge. Answered her questions as best as possible.
Explained she needs to speak with the Doctor for some of her questions. She is
upset because she keeps missing the docotr. No other changes at this time.
Call light within reach.

## 2021-09-09 NOTE — NUR
Soap suds enema complete at this time. NOted to have 100mls of brown liquid in
colostomy prior to enema. Emptied at this time.

## 2021-09-10 VITALS — DIASTOLIC BLOOD PRESSURE: 59 MMHG | HEART RATE: 77 BPM | TEMPERATURE: 98.3 F | SYSTOLIC BLOOD PRESSURE: 106 MMHG

## 2021-09-10 VITALS — HEART RATE: 80 BPM | SYSTOLIC BLOOD PRESSURE: 124 MMHG | DIASTOLIC BLOOD PRESSURE: 54 MMHG | TEMPERATURE: 97.9 F

## 2021-09-10 VITALS — HEART RATE: 75 BPM | SYSTOLIC BLOOD PRESSURE: 102 MMHG | DIASTOLIC BLOOD PRESSURE: 42 MMHG | TEMPERATURE: 98.3 F

## 2021-09-10 LAB
ANION GAP SERPL CALC-SCNC: 8 MMOL/L (ref 7–16)
BASOPHILS # BLD: 0 10*3/UL (ref 0–0.2)
BASOPHILS NFR BLD AUTO: 0.3 % (ref 0–2)
BUN SERPL-MCNC: 20 MG/DL (ref 9–20)
CALCIUM SERPL-MCNC: 8.5 MG/DL (ref 8.4–10.2)
CHLORIDE SERPL-SCNC: 106 MMOL/L (ref 98–107)
CO2 SERPL-SCNC: 25 MMOL/L (ref 22–30)
CREAT SERPL-SCNC: 0.73 UMOL/L (ref 0.66–1.25)
EOSINOPHIL # BLD: 0.1 10*3/UL (ref 0–0.7)
EOSINOPHIL NFR BLD: 0.9 % (ref 0–4)
ERYTHROCYTE [DISTWIDTH] IN BLOOD BY AUTOMATED COUNT: 14.1 % (ref 11.5–14.5)
GLUCOSE SERPL-MCNC: 111 MG/DL (ref 74–106)
GRANULOCYTES # BLD AUTO: 71.8 % (ref 42.2–75.2)
HCT VFR BLD AUTO: 30.3 % (ref 42–52)
HGB BLD-MCNC: 10.1 G/DL (ref 13.5–18)
LYMPHOCYTES # BLD: 1.7 10*3/UL (ref 1.2–3.4)
LYMPHOCYTES NFR BLD: 17.1 % (ref 20–51)
MCH RBC QN AUTO: 27 PG (ref 27–31)
MCHC RBC AUTO-ENTMCNC: 33 G/DL (ref 33–37)
MCV RBC AUTO: 80 FL (ref 80–100)
MONOCYTES # BLD: 0.9 10*3/UL (ref 0.1–0.6)
MONOCYTES NFR BLD AUTO: 9.1 % (ref 1.7–9.3)
NEUTROPHILS # BLD: 7.2 10*3/UL (ref 1.4–6.5)
PLATELET # BLD AUTO: 371 K/MM3 (ref 130–400)
PMV BLD AUTO: 9.2 FL (ref 7.4–10.4)
POTASSIUM SERPL-SCNC: 3.8 MMOL/L (ref 3.4–5)
RBC # BLD AUTO: 3.8 M/MM3 (ref 4.2–5.6)
SODIUM SERPL-SCNC: 139 MMOL/L (ref 137–145)

## 2021-09-10 NOTE — NUR
Discontinued central line from left chest. Patient tolerated well. The
catheter had a blunt end, no fraying noted. Central line site clean and dry,
no reddness noted. This nurse held pressure for 15mins with gauze. Fresh gauze
and tegaderm placed to site. Explained to patient and his daughter he has to
lay flat for 30mins. Patient tolerated procedure well.
 
After removing the catheter, the daughter stated she was
going to the bathroom and she started to fall against the wall outside the
room. She than fell and passed out. She was unresponsive for less than 30
seconds. Her  is at bedside as well. We got her up to a wheel chair.
She did not hit her head and denies any injuries. She slid against the wall to
the ground. She is alert and oriented. She was able to walk to couch and sit
down. Her  is with her at this time.

## 2021-09-10 NOTE — NUR
Patient is discharging home. Discussed discharge instructions with family and
patient. His daughter had to help interpret for patient. The patients
son-in-law stated it was all overwhelming. Answered all the questions that I
was able to answer. Gave them a lot of education to read over. The home health
nurse will see the patient at home tomorrow. Gave them two appliances and 2
bags to go home with incase they need to change it. Explained how to measure
the site. Explained how and how often to drain the pouch. Explained
medications he is discharging home with. Copies of discharge instructions
given to patient. Explained to keep dressing to central line site for 24-48
hours. Explained lifting restrictions. Patient had his walker delivered from
[employer] [facility]. No other changes at this time. All belongings packed up
and sent with patient. Patient walked out via wheel chair.

## 2021-09-10 NOTE — NUR
Student nurse flushed colostomy as ordered. Patient has been doing well this
morning. Discussed the plan for possible discharge today with his daughter.
She will be up once he has orders with her . He will be the one to care
for the colostomy site and help to drain. No other changes at this time. Call
light within reach.

## 2021-11-01 NOTE — NUR
SW: Adela, verified with Jenny at [facility] that someone will meet with them
tomorrow 09/11 and faxed D/C orders. No

## 2022-01-05 ENCOUNTER — HOSPITAL ENCOUNTER (EMERGENCY)
Dept: HOSPITAL 19 - COL.ER | Age: 67
Discharge: HOME | End: 2022-01-05
Attending: EMERGENCY MEDICINE
Payer: MEDICARE

## 2022-01-05 VITALS — BODY MASS INDEX: 27.39 KG/M2 | WEIGHT: 170.42 LBS | HEIGHT: 65.98 IN

## 2022-01-05 VITALS — HEART RATE: 80 BPM | SYSTOLIC BLOOD PRESSURE: 122 MMHG | DIASTOLIC BLOOD PRESSURE: 61 MMHG

## 2022-01-05 VITALS — TEMPERATURE: 98 F

## 2022-01-05 DIAGNOSIS — M54.9: Primary | ICD-10-CM

## 2022-01-05 DIAGNOSIS — R91.8: ICD-10-CM

## 2022-01-05 LAB
ANION GAP SERPL CALC-SCNC: 12 MMOL/L (ref 7–16)
BUN SERPL-MCNC: 18 MG/DL (ref 8–26)
CALCIUM SERPL-MCNC: 9.7 MG/DL (ref 8.4–10.2)
CHLORIDE SERPL-SCNC: 107 MMOL/L (ref 98–107)
CO2 SERPL-SCNC: 22 MMOL/L (ref 23–31)
CREAT SERPL-SCNC: 0.8 MG/DL (ref 0.72–1.25)
GLUCOSE SERPL-MCNC: 103 MG/DL (ref 70–99)
PH UR STRIP.AUTO: 6 [PH] (ref 5–8)
POTASSIUM SERPL-SCNC: 4 MMOL/L (ref 3.5–4.5)
RBC # UR: (no result) /HPF (ref 0–2)
SODIUM SERPL-SCNC: 141 MMOL/L (ref 136–145)
SP GR UR STRIP.AUTO: 1.01 (ref 1–1.03)
URN COLLECT METHOD CLASS: (no result)

## 2022-02-07 ENCOUNTER — HOSPITAL ENCOUNTER (EMERGENCY)
Dept: HOSPITAL 19 - COL.ER | Age: 67
Discharge: HOME | End: 2022-02-07
Attending: EMERGENCY MEDICINE
Payer: MEDICARE

## 2022-02-07 VITALS — HEIGHT: 65.98 IN | WEIGHT: 165.35 LBS | BODY MASS INDEX: 26.57 KG/M2

## 2022-02-07 VITALS — TEMPERATURE: 98.1 F | DIASTOLIC BLOOD PRESSURE: 79 MMHG | HEART RATE: 78 BPM | SYSTOLIC BLOOD PRESSURE: 132 MMHG

## 2022-02-07 DIAGNOSIS — F17.220: ICD-10-CM

## 2022-02-07 DIAGNOSIS — C79.9: ICD-10-CM

## 2022-02-07 DIAGNOSIS — K59.00: Primary | ICD-10-CM

## 2022-02-07 DIAGNOSIS — C18.9: ICD-10-CM

## 2022-02-07 LAB
ALBUMIN SERPL-MCNC: 3.7 GM/DL (ref 3.4–4.8)
ALP SERPL-CCNC: 79 U/L (ref 40–150)
ALT SERPL-CCNC: 13 U/L (ref 0–55)
ANION GAP SERPL CALC-SCNC: 10 MMOL/L (ref 7–16)
AST SERPL-CCNC: 16 U/L (ref 5–34)
BASOPHILS # BLD: 0 K/MM3 (ref 0–0.2)
BASOPHILS NFR BLD AUTO: 0.6 % (ref 0–2)
BILIRUB SERPL-MCNC: 0.3 MG/DL (ref 0.2–1.2)
BUN SERPL-MCNC: 14 MG/DL (ref 8–26)
CALCIUM SERPL-MCNC: 9.4 MG/DL (ref 8.4–10.2)
CHLORIDE SERPL-SCNC: 108 MMOL/L (ref 98–107)
CO2 SERPL-SCNC: 23 MMOL/L (ref 23–31)
CREAT SERPL-SCNC: 0.76 MG/DL (ref 0.72–1.25)
EOSINOPHIL # BLD: 0.1 K/MM3 (ref 0–0.7)
EOSINOPHIL NFR BLD: 1.6 % (ref 0–4)
ERYTHROCYTE [DISTWIDTH] IN BLOOD BY AUTOMATED COUNT: 17.4 % (ref 11.5–14.5)
GLUCOSE SERPL-MCNC: 107 MG/DL (ref 70–99)
GRANULOCYTES # BLD AUTO: 53.5 % (ref 42.2–75.2)
HCT VFR BLD AUTO: 38.1 % (ref 42–52)
HGB BLD-MCNC: 12.3 G/DL (ref 13.5–18)
LYMPHOCYTES # BLD: 2.1 K/MM3 (ref 1.2–3.4)
LYMPHOCYTES NFR BLD: 33.7 % (ref 20–51)
MCH RBC QN AUTO: 25 PG (ref 27–31)
MCHC RBC AUTO-ENTMCNC: 32 G/DL (ref 33–37)
MCV RBC AUTO: 76 FL (ref 80–100)
MONOCYTES # BLD: 0.6 K/MM3 (ref 0.1–0.6)
MONOCYTES NFR BLD AUTO: 10.3 % (ref 1.7–9.3)
NEUTROPHILS # BLD: 3.3 K/MM3 (ref 1.4–6.5)
PLATELET # BLD AUTO: 264 K/MM3 (ref 130–400)
PMV BLD AUTO: 9.6 FL (ref 7.4–10.4)
POTASSIUM SERPL-SCNC: 4.1 MMOL/L (ref 3.5–4.5)
PROT SERPL-MCNC: 7.5 GM/DL (ref 6.2–8.1)
RBC # BLD AUTO: 4.99 M/MM3 (ref 4.2–5.6)
SODIUM SERPL-SCNC: 141 MMOL/L (ref 136–145)

## 2022-02-24 ENCOUNTER — HOSPITAL ENCOUNTER (OUTPATIENT)
Dept: HOSPITAL 19 - EUO | Age: 67
LOS: 4 days | End: 2022-02-28
Payer: MEDICARE

## 2022-02-24 VITALS — HEART RATE: 80 BPM | DIASTOLIC BLOOD PRESSURE: 64 MMHG | TEMPERATURE: 97.8 F | SYSTOLIC BLOOD PRESSURE: 123 MMHG

## 2022-02-24 DIAGNOSIS — C18.7: Primary | ICD-10-CM

## 2022-02-24 LAB
ALBUMIN SERPL-MCNC: 3.6 GM/DL (ref 3.4–4.8)
ALP SERPL-CCNC: 60 U/L (ref 40–150)
ALT SERPL-CCNC: 9 U/L (ref 0–55)
ANION GAP SERPL CALC-SCNC: 11 MMOL/L (ref 7–16)
ANISOCYTOSIS BLD QL: (no result)
AST SERPL-CCNC: 20 U/L (ref 5–34)
BILIRUB SERPL-MCNC: 0.4 MG/DL (ref 0.2–1.2)
BUN SERPL-MCNC: 21 MG/DL (ref 8–26)
CALCIUM SERPL-MCNC: 9 MG/DL (ref 8.4–10.2)
CHLORIDE SERPL-SCNC: 107 MMOL/L (ref 98–107)
CO2 SERPL-SCNC: 22 MMOL/L (ref 23–31)
CREAT SERPL-SCNC: 0.75 MG/DL (ref 0.72–1.25)
EOSINOPHIL NFR BLD: 5 % (ref 0–4)
ERYTHROCYTE [DISTWIDTH] IN BLOOD BY AUTOMATED COUNT: 17.3 % (ref 11.5–14.5)
GLUCOSE SERPL-MCNC: 110 MG/DL (ref 70–99)
HCT VFR BLD AUTO: 37.9 % (ref 42–52)
HGB BLD-MCNC: 12.3 G/DL (ref 13.5–18)
HYPOCHROMIA BLD QL SMEAR: (no result)
LYMPHOCYTES NFR BLD MANUAL: 33 % (ref 20–51)
MCH RBC QN AUTO: 25 PG (ref 27–31)
MCHC RBC AUTO-ENTMCNC: 33 G/DL (ref 33–37)
MCV RBC AUTO: 77 FL (ref 80–100)
MONOCYTES NFR BLD: 14 % (ref 1.7–9.3)
NEUTS BAND NFR BLD: 2 % (ref 0–10)
NEUTS SEG NFR BLD MANUAL: 46 % (ref 42–75.2)
PLATELET # BLD AUTO: 207 K/MM3 (ref 130–400)
PLATELET BLD QL SMEAR: NORMAL
PMV BLD AUTO: 9.9 FL (ref 7.4–10.4)
POTASSIUM SERPL-SCNC: 3.9 MMOL/L (ref 3.5–4.5)
PROT SERPL-MCNC: 7 GM/DL (ref 6.2–8.1)
RBC # BLD AUTO: 4.9 M/MM3 (ref 4.2–5.6)
SODIUM SERPL-SCNC: 140 MMOL/L (ref 136–145)

## 2022-02-24 PROCEDURE — C1751 CATH, INF, PER/CENT/MIDLINE: HCPCS

## 2022-02-24 NOTE — NUR
Pt assisted out by wheelchair to daughter's car. He will plan to return
Tuesday mornings for PICC cares and labs.

## 2022-03-01 VITALS — TEMPERATURE: 98.3 F | DIASTOLIC BLOOD PRESSURE: 66 MMHG | SYSTOLIC BLOOD PRESSURE: 118 MMHG | HEART RATE: 82 BPM

## 2022-03-03 VITALS — DIASTOLIC BLOOD PRESSURE: 56 MMHG | TEMPERATURE: 98.1 F | SYSTOLIC BLOOD PRESSURE: 100 MMHG | HEART RATE: 72 BPM

## 2022-03-08 NOTE — NUR
PICC dressing change done on 3/90612 with arm measurement 32, insertion site
marking at 0. No signs or symptoms of IV complications noted. Sterile dressing
change done with insertion site cleansed with chloraprep x 1, CHG dressing
applied, skin prep, stat lock, and tegaderm applied.

## 2022-03-10 VITALS — HEART RATE: 92 BPM | TEMPERATURE: 97.4 F | SYSTOLIC BLOOD PRESSURE: 118 MMHG | DIASTOLIC BLOOD PRESSURE: 64 MMHG

## 2022-03-10 LAB
ALBUMIN SERPL-MCNC: 3.9 GM/DL (ref 3.4–4.8)
ALP SERPL-CCNC: 60 U/L (ref 40–150)
ALT SERPL-CCNC: 17 U/L (ref 0–55)
ANION GAP SERPL CALC-SCNC: 9 MMOL/L (ref 7–16)
ANISOCYTOSIS BLD QL: (no result)
AST SERPL-CCNC: 17 U/L (ref 5–34)
BILIRUB SERPL-MCNC: 0.2 MG/DL (ref 0.2–1.2)
BUN SERPL-MCNC: 20 MG/DL (ref 8–26)
CALCIUM SERPL-MCNC: 9.1 MG/DL (ref 8.4–10.2)
CHLORIDE SERPL-SCNC: 105 MMOL/L (ref 98–107)
CO2 SERPL-SCNC: 23 MMOL/L (ref 23–31)
CREAT SERPL-SCNC: 0.8 MG/DL (ref 0.72–1.25)
EOSINOPHIL NFR BLD: 2 % (ref 0–4)
ERYTHROCYTE [DISTWIDTH] IN BLOOD BY AUTOMATED COUNT: 17.8 % (ref 11.5–14.5)
GLUCOSE SERPL-MCNC: 117 MG/DL (ref 70–99)
HCT VFR BLD AUTO: 38.2 % (ref 42–52)
HGB BLD-MCNC: 12.7 G/DL (ref 13.5–18)
LYMPHOCYTES NFR BLD MANUAL: 32 % (ref 20–51)
MCH RBC QN AUTO: 26 PG (ref 27–31)
MCHC RBC AUTO-ENTMCNC: 33 G/DL (ref 33–37)
MCV RBC AUTO: 78 FL (ref 80–100)
MICROCYTES BLD QL SMEAR: (no result)
MONOCYTES NFR BLD: 6 % (ref 1.7–9.3)
NEUTS SEG NFR BLD MANUAL: 60 % (ref 42–75.2)
PLATELET # BLD AUTO: 214 K/MM3 (ref 130–400)
PLATELET BLD QL SMEAR: NORMAL
PMV BLD AUTO: 9.6 FL (ref 7.4–10.4)
POTASSIUM SERPL-SCNC: 3.8 MMOL/L (ref 3.5–4.5)
PROT SERPL-MCNC: 7 GM/DL (ref 6.2–8.1)
RBC # BLD AUTO: 4.93 M/MM3 (ref 4.2–5.6)
SODIUM SERPL-SCNC: 137 MMOL/L (ref 136–145)

## 2022-03-17 VITALS — SYSTOLIC BLOOD PRESSURE: 108 MMHG | HEART RATE: 68 BPM | DIASTOLIC BLOOD PRESSURE: 65 MMHG | TEMPERATURE: 97.6 F

## 2022-03-17 NOTE — NUR
Here for PICC cares.  PICC intact.  Patient's right upper arm PICC dressing
change done with sterile technique.  Insertion site was cleansed with
ChloraPrep x1, skin prep, StatLock, and Tegaderm applied.  No signs or
symptoms of IV complications noted.  Patient to return next week.  Patient
voiced understanding of instructions.

## 2022-03-24 VITALS — HEART RATE: 59 BPM | DIASTOLIC BLOOD PRESSURE: 66 MMHG | TEMPERATURE: 98.2 F | SYSTOLIC BLOOD PRESSURE: 115 MMHG

## 2022-03-24 LAB
ALBUMIN SERPL-MCNC: 3.7 GM/DL (ref 3.4–4.8)
ALP SERPL-CCNC: 73 U/L (ref 40–150)
ALT SERPL-CCNC: 15 U/L (ref 0–55)
ANION GAP SERPL CALC-SCNC: 10 MMOL/L (ref 7–16)
AST SERPL-CCNC: 15 U/L (ref 5–34)
BILIRUB SERPL-MCNC: 0.2 MG/DL (ref 0.2–1.2)
BUN SERPL-MCNC: 20 MG/DL (ref 8–26)
CALCIUM SERPL-MCNC: 8.9 MG/DL (ref 8.4–10.2)
CHLORIDE SERPL-SCNC: 108 MMOL/L (ref 98–107)
CO2 SERPL-SCNC: 22 MMOL/L (ref 23–31)
CREAT SERPL-SCNC: 0.81 MG/DL (ref 0.72–1.25)
EOSINOPHIL NFR BLD: 4 % (ref 0–4)
ERYTHROCYTE [DISTWIDTH] IN BLOOD BY AUTOMATED COUNT: 17.6 % (ref 11.5–14.5)
GLUCOSE SERPL-MCNC: 142 MG/DL (ref 70–99)
HCT VFR BLD AUTO: 36.3 % (ref 42–52)
HGB BLD-MCNC: 12.1 G/DL (ref 13.5–18)
LYMPHOCYTES NFR BLD MANUAL: 36 % (ref 20–51)
MCH RBC QN AUTO: 26 PG (ref 27–31)
MCHC RBC AUTO-ENTMCNC: 33 G/DL (ref 33–37)
MCV RBC AUTO: 78 FL (ref 80–100)
MONOCYTES NFR BLD: 11 % (ref 1.7–9.3)
NEUTS SEG NFR BLD MANUAL: 49 % (ref 42–75.2)
OVALOCYTES BLD QL SMEAR: (no result)
PLATELET # BLD AUTO: 189 K/MM3 (ref 130–400)
PLATELET BLD QL SMEAR: NORMAL
PMV BLD AUTO: 9.2 FL (ref 7.4–10.4)
POLYCHROMASIA BLD QL SMEAR: (no result)
POTASSIUM SERPL-SCNC: 3.6 MMOL/L (ref 3.5–4.5)
PROT SERPL-MCNC: 6.8 GM/DL (ref 6.2–8.1)
RBC # BLD AUTO: 4.64 M/MM3 (ref 4.2–5.6)
SODIUM SERPL-SCNC: 140 MMOL/L (ref 136–145)

## 2022-03-31 ENCOUNTER — HOSPITAL ENCOUNTER (OUTPATIENT)
Dept: HOSPITAL 19 - EUO | Age: 67
Discharge: HOME | End: 2022-03-31
Payer: MEDICARE

## 2022-03-31 VITALS — SYSTOLIC BLOOD PRESSURE: 88 MMHG | HEART RATE: 76 BPM | DIASTOLIC BLOOD PRESSURE: 51 MMHG | TEMPERATURE: 97.8 F

## 2022-03-31 VITALS — HEIGHT: 65.98 IN | WEIGHT: 166.89 LBS | BODY MASS INDEX: 26.82 KG/M2

## 2022-03-31 DIAGNOSIS — C18.9: Primary | ICD-10-CM

## 2022-04-07 VITALS — SYSTOLIC BLOOD PRESSURE: 111 MMHG | HEART RATE: 77 BPM | TEMPERATURE: 97.6 F | DIASTOLIC BLOOD PRESSURE: 60 MMHG

## 2022-04-14 VITALS — HEART RATE: 86 BPM | TEMPERATURE: 98.4 F | DIASTOLIC BLOOD PRESSURE: 80 MMHG | SYSTOLIC BLOOD PRESSURE: 121 MMHG

## 2022-04-14 LAB
ALBUMIN SERPL-MCNC: 3.7 GM/DL (ref 3.4–4.8)
ALP SERPL-CCNC: 84 U/L (ref 40–150)
ALT SERPL-CCNC: 14 U/L (ref 0–55)
ANION GAP SERPL CALC-SCNC: 10 MMOL/L (ref 7–16)
ANISOCYTOSIS BLD QL: (no result)
AST SERPL-CCNC: 16 U/L (ref 5–34)
BASOPHILS NFR BLD MANUAL: 1 % (ref 0–2)
BILIRUB SERPL-MCNC: 0.3 MG/DL (ref 0.2–1.2)
BUN SERPL-MCNC: 26 MG/DL (ref 8–26)
CALCIUM SERPL-MCNC: 9.2 MG/DL (ref 8.4–10.2)
CHLORIDE SERPL-SCNC: 108 MMOL/L (ref 98–107)
CO2 SERPL-SCNC: 22 MMOL/L (ref 23–31)
CREAT SERPL-SCNC: 0.93 MG/DL (ref 0.72–1.25)
EOSINOPHIL NFR BLD: 1 % (ref 0–4)
ERYTHROCYTE [DISTWIDTH] IN BLOOD BY AUTOMATED COUNT: 19.1 % (ref 11.5–14.5)
GLUCOSE SERPL-MCNC: 140 MG/DL (ref 70–99)
HCT VFR BLD AUTO: 36.4 % (ref 42–52)
HGB BLD-MCNC: 12.5 G/DL (ref 13.5–18)
LYMPHOCYTES NFR BLD MANUAL: 34 % (ref 20–51)
MCH RBC QN AUTO: 27 PG (ref 27–31)
MCHC RBC AUTO-ENTMCNC: 34 G/DL (ref 33–37)
MCV RBC AUTO: 79 FL (ref 80–100)
MICROCYTES BLD QL SMEAR: (no result)
MONOCYTES NFR BLD: 13 % (ref 1.7–9.3)
NEUTS SEG NFR BLD MANUAL: 51 % (ref 42–75.2)
PLATELET # BLD AUTO: 172 K/MM3 (ref 130–400)
PLATELET BLD QL SMEAR: NORMAL
PMV BLD AUTO: 9.3 FL (ref 7.4–10.4)
POTASSIUM SERPL-SCNC: 3.7 MMOL/L (ref 3.5–4.5)
PROT SERPL-MCNC: 6.6 GM/DL (ref 6.2–8.1)
RBC # BLD AUTO: 4.63 M/MM3 (ref 4.2–5.6)
SODIUM SERPL-SCNC: 140 MMOL/L (ref 136–145)

## 2022-04-21 VITALS — SYSTOLIC BLOOD PRESSURE: 109 MMHG | DIASTOLIC BLOOD PRESSURE: 49 MMHG | HEART RATE: 74 BPM | TEMPERATURE: 98.2 F

## 2022-04-28 ENCOUNTER — HOSPITAL ENCOUNTER (OUTPATIENT)
Dept: HOSPITAL 19 - EUO | Age: 67
LOS: 2 days | Discharge: HOME | End: 2022-04-30
Payer: MEDICARE

## 2022-04-28 VITALS — DIASTOLIC BLOOD PRESSURE: 69 MMHG | TEMPERATURE: 98.9 F | HEART RATE: 70 BPM | SYSTOLIC BLOOD PRESSURE: 124 MMHG

## 2022-04-28 VITALS — BODY MASS INDEX: 27.71 KG/M2 | HEIGHT: 65.98 IN | WEIGHT: 172.4 LBS

## 2022-04-28 DIAGNOSIS — C18.7: Primary | ICD-10-CM

## 2022-05-05 VITALS — TEMPERATURE: 98.1 F | SYSTOLIC BLOOD PRESSURE: 116 MMHG | DIASTOLIC BLOOD PRESSURE: 79 MMHG | HEART RATE: 82 BPM

## 2022-05-05 LAB
ALBUMIN SERPL-MCNC: 3.9 GM/DL (ref 3.4–4.8)
ALP SERPL-CCNC: 123 U/L (ref 40–150)
ALT SERPL-CCNC: 13 U/L (ref 0–55)
ANION GAP SERPL CALC-SCNC: 11 MMOL/L (ref 7–16)
ANISOCYTOSIS BLD QL: (no result)
AST SERPL-CCNC: 17 U/L (ref 5–34)
BILIRUB SERPL-MCNC: 0.5 MG/DL (ref 0.2–1.2)
BUN SERPL-MCNC: 22 MG/DL (ref 8–26)
CALCIUM SERPL-MCNC: 8.8 MG/DL (ref 8.4–10.2)
CHLORIDE SERPL-SCNC: 108 MMOL/L (ref 98–107)
CO2 SERPL-SCNC: 21 MMOL/L (ref 23–31)
CREAT SERPL-SCNC: 0.91 MG/DL (ref 0.72–1.25)
EOSINOPHIL NFR BLD: 1 % (ref 0–4)
ERYTHROCYTE [DISTWIDTH] IN BLOOD BY AUTOMATED COUNT: 21.2 % (ref 11.5–14.5)
GLUCOSE SERPL-MCNC: 115 MG/DL (ref 70–99)
HCT VFR BLD AUTO: 37.4 % (ref 42–52)
HGB BLD-MCNC: 12.5 G/DL (ref 13.5–18)
LYMPHOCYTES NFR BLD MANUAL: 37 % (ref 20–51)
MCH RBC QN AUTO: 28 PG (ref 27–31)
MCHC RBC AUTO-ENTMCNC: 33 G/DL (ref 33–37)
MCV RBC AUTO: 83 FL (ref 80–100)
MONOCYTES NFR BLD: 16 % (ref 1.7–9.3)
NEUTS BAND NFR BLD: 6 % (ref 0–10)
NEUTS SEG NFR BLD MANUAL: 40 % (ref 42–75.2)
PLATELET # BLD AUTO: 161 K/MM3 (ref 130–400)
PLATELET BLD QL SMEAR: NORMAL
PMV BLD AUTO: 9.2 FL (ref 7.4–10.4)
POTASSIUM SERPL-SCNC: 4.1 MMOL/L (ref 3.5–4.5)
PROT SERPL-MCNC: 6.7 GM/DL (ref 6.2–8.1)
RBC # BLD AUTO: 4.51 M/MM3 (ref 4.2–5.6)
SODIUM SERPL-SCNC: 140 MMOL/L (ref 136–145)

## 2022-05-12 VITALS — DIASTOLIC BLOOD PRESSURE: 57 MMHG | HEART RATE: 83 BPM | SYSTOLIC BLOOD PRESSURE: 121 MMHG | TEMPERATURE: 98.4 F

## 2022-05-19 VITALS — TEMPERATURE: 97.8 F | DIASTOLIC BLOOD PRESSURE: 51 MMHG | SYSTOLIC BLOOD PRESSURE: 110 MMHG | HEART RATE: 89 BPM

## 2022-05-24 VITALS — SYSTOLIC BLOOD PRESSURE: 124 MMHG | DIASTOLIC BLOOD PRESSURE: 60 MMHG | HEART RATE: 87 BPM | TEMPERATURE: 97.7 F

## 2022-05-24 LAB
ALBUMIN SERPL-MCNC: 3.7 GM/DL (ref 3.4–4.8)
ALP SERPL-CCNC: 106 U/L (ref 40–150)
ALT SERPL-CCNC: 21 U/L (ref 0–55)
ANION GAP SERPL CALC-SCNC: 11 MMOL/L (ref 7–16)
ANISOCYTOSIS BLD QL: (no result)
AST SERPL-CCNC: 19 U/L (ref 5–34)
BILIRUB SERPL-MCNC: 0.8 MG/DL (ref 0.2–1.2)
BUN SERPL-MCNC: 19 MG/DL (ref 8–26)
CALCIUM SERPL-MCNC: 9.2 MG/DL (ref 8.4–10.2)
CHLORIDE SERPL-SCNC: 108 MMOL/L (ref 98–107)
CO2 SERPL-SCNC: 21 MMOL/L (ref 23–31)
CREAT SERPL-SCNC: 0.76 MG/DL (ref 0.72–1.25)
ERYTHROCYTE [DISTWIDTH] IN BLOOD BY AUTOMATED COUNT: 22 % (ref 11.5–14.5)
GLUCOSE SERPL-MCNC: 138 MG/DL (ref 70–99)
HCT VFR BLD AUTO: 36.4 % (ref 42–52)
HGB BLD-MCNC: 12.4 G/DL (ref 13.5–18)
LYMPHOCYTES NFR BLD MANUAL: 43 % (ref 20–51)
MCH RBC QN AUTO: 29 PG (ref 27–31)
MCHC RBC AUTO-ENTMCNC: 34 G/DL (ref 33–37)
MCV RBC AUTO: 86 FL (ref 80–100)
MONOCYTES NFR BLD: 22 % (ref 1.7–9.3)
NEUTS SEG NFR BLD MANUAL: 35 % (ref 42–75.2)
PLATELET # BLD AUTO: 141 K/MM3 (ref 130–400)
PLATELET BLD QL SMEAR: NORMAL
PMV BLD AUTO: 10 FL (ref 7.4–10.4)
POTASSIUM SERPL-SCNC: 3.6 MMOL/L (ref 3.5–4.5)
PROT SERPL-MCNC: 6.3 GM/DL (ref 6.2–8.1)
RBC # BLD AUTO: 4.24 M/MM3 (ref 4.2–5.6)
SODIUM SERPL-SCNC: 140 MMOL/L (ref 136–145)

## 2022-05-26 ENCOUNTER — HOSPITAL ENCOUNTER (OUTPATIENT)
Dept: HOSPITAL 19 - EUO | Age: 67
LOS: 5 days | Discharge: HOME | End: 2022-05-31
Payer: MEDICARE

## 2022-05-26 VITALS — BODY MASS INDEX: 28.81 KG/M2 | WEIGHT: 179.24 LBS | HEIGHT: 65.98 IN

## 2022-05-26 VITALS — SYSTOLIC BLOOD PRESSURE: 138 MMHG | HEART RATE: 95 BPM | DIASTOLIC BLOOD PRESSURE: 77 MMHG | TEMPERATURE: 97.3 F

## 2022-05-26 DIAGNOSIS — C18.7: Primary | ICD-10-CM

## 2022-06-02 VITALS — SYSTOLIC BLOOD PRESSURE: 111 MMHG | DIASTOLIC BLOOD PRESSURE: 66 MMHG | TEMPERATURE: 98.7 F | HEART RATE: 55 BPM

## 2022-06-09 VITALS — DIASTOLIC BLOOD PRESSURE: 62 MMHG | SYSTOLIC BLOOD PRESSURE: 126 MMHG | HEART RATE: 88 BPM | TEMPERATURE: 97.9 F

## 2022-06-16 VITALS — SYSTOLIC BLOOD PRESSURE: 105 MMHG | HEART RATE: 87 BPM | TEMPERATURE: 98.6 F | DIASTOLIC BLOOD PRESSURE: 45 MMHG

## 2022-06-16 LAB
ALBUMIN SERPL-MCNC: 3.3 GM/DL (ref 3.4–4.8)
ALP SERPL-CCNC: 108 U/L (ref 40–150)
ALT SERPL-CCNC: 15 U/L (ref 0–55)
ANION GAP SERPL CALC-SCNC: 11 MMOL/L (ref 7–16)
ANISOCYTOSIS BLD QL: (no result)
AST SERPL-CCNC: 19 U/L (ref 5–34)
BILIRUB SERPL-MCNC: 0.9 MG/DL (ref 0.2–1.2)
BUN SERPL-MCNC: 19 MG/DL (ref 8–26)
CALCIUM SERPL-MCNC: 8.8 MG/DL (ref 8.4–10.2)
CHLORIDE SERPL-SCNC: 108 MMOL/L (ref 98–107)
CO2 SERPL-SCNC: 23 MMOL/L (ref 23–31)
CREAT SERPL-SCNC: 0.76 MG/DL (ref 0.72–1.25)
EOSINOPHIL NFR BLD: 3 % (ref 0–4)
ERYTHROCYTE [DISTWIDTH] IN BLOOD BY AUTOMATED COUNT: 20.2 % (ref 11.5–14.5)
GLUCOSE SERPL-MCNC: 121 MG/DL (ref 70–99)
HCT VFR BLD AUTO: 36.4 % (ref 42–52)
HGB BLD-MCNC: 12.3 G/DL (ref 13.5–18)
LYMPHOCYTES NFR BLD MANUAL: 36 % (ref 20–51)
MCH RBC QN AUTO: 31 PG (ref 27–31)
MCHC RBC AUTO-ENTMCNC: 34 G/DL (ref 33–37)
MCV RBC AUTO: 91 FL (ref 80–100)
MONOCYTES NFR BLD: 20 % (ref 1.7–9.3)
NEUTS SEG NFR BLD MANUAL: 41 % (ref 42–75.2)
PLATELET # BLD AUTO: 136 K/MM3 (ref 130–400)
PLATELET BLD QL SMEAR: NORMAL
PMV BLD AUTO: 9.8 FL (ref 7.4–10.4)
POTASSIUM SERPL-SCNC: 3.7 MMOL/L (ref 3.5–4.5)
PROT SERPL-MCNC: 6.3 GM/DL (ref 6.2–8.1)
RBC # BLD AUTO: 4.01 M/MM3 (ref 4.2–5.6)
SODIUM SERPL-SCNC: 142 MMOL/L (ref 136–145)

## 2022-06-23 VITALS — TEMPERATURE: 98.6 F | DIASTOLIC BLOOD PRESSURE: 68 MMHG | SYSTOLIC BLOOD PRESSURE: 119 MMHG | HEART RATE: 66 BPM

## 2022-06-27 ENCOUNTER — HOSPITAL ENCOUNTER (EMERGENCY)
Dept: HOSPITAL 19 - COL.ER | Age: 67
Discharge: HOME | End: 2022-06-27
Payer: MEDICARE

## 2022-06-27 VITALS — HEIGHT: 66.14 IN | BODY MASS INDEX: 28.17 KG/M2 | WEIGHT: 175.27 LBS

## 2022-06-27 VITALS — SYSTOLIC BLOOD PRESSURE: 142 MMHG | HEART RATE: 68 BPM | TEMPERATURE: 99.1 F | DIASTOLIC BLOOD PRESSURE: 60 MMHG

## 2022-06-27 DIAGNOSIS — Z28.310: ICD-10-CM

## 2022-06-27 DIAGNOSIS — F17.200: ICD-10-CM

## 2022-06-27 DIAGNOSIS — U07.1: Primary | ICD-10-CM

## 2022-06-27 DIAGNOSIS — C18.9: ICD-10-CM

## 2022-06-30 ENCOUNTER — HOSPITAL ENCOUNTER (OUTPATIENT)
Dept: HOSPITAL 19 - EUO | Age: 67
Discharge: STILL ADMITTED OR ADMINISTRATIVE | End: 2022-06-30
Payer: MEDICARE

## 2022-06-30 VITALS — DIASTOLIC BLOOD PRESSURE: 57 MMHG | HEART RATE: 74 BPM | TEMPERATURE: 98.5 F | SYSTOLIC BLOOD PRESSURE: 129 MMHG

## 2022-06-30 VITALS — BODY MASS INDEX: 28.98 KG/M2 | HEIGHT: 65.98 IN | WEIGHT: 180.34 LBS

## 2022-06-30 DIAGNOSIS — C18.7: Primary | ICD-10-CM

## 2022-07-07 VITALS — SYSTOLIC BLOOD PRESSURE: 108 MMHG | DIASTOLIC BLOOD PRESSURE: 66 MMHG | HEART RATE: 57 BPM | TEMPERATURE: 98.8 F

## 2022-07-07 LAB
ALBUMIN SERPL-MCNC: 3.3 GM/DL (ref 3.4–4.8)
ALP SERPL-CCNC: 84 U/L (ref 40–150)
ALT SERPL-CCNC: 14 U/L (ref 0–55)
ANION GAP SERPL CALC-SCNC: 11 MMOL/L (ref 7–16)
AST SERPL-CCNC: 17 U/L (ref 5–34)
BILIRUB SERPL-MCNC: 0.7 MG/DL (ref 0.2–1.2)
BUN SERPL-MCNC: 15 MG/DL (ref 8–26)
CALCIUM SERPL-MCNC: 9.2 MG/DL (ref 8.4–10.2)
CHLORIDE SERPL-SCNC: 107 MMOL/L (ref 98–107)
CO2 SERPL-SCNC: 23 MMOL/L (ref 23–31)
CREAT SERPL-SCNC: 0.82 MG/DL (ref 0.72–1.25)
EOSINOPHIL NFR BLD: 2 % (ref 0–4)
ERYTHROCYTE [DISTWIDTH] IN BLOOD BY AUTOMATED COUNT: 15 % (ref 11.5–14.5)
GLUCOSE SERPL-MCNC: 138 MG/DL (ref 70–99)
HCT VFR BLD AUTO: 37.2 % (ref 42–52)
HGB BLD-MCNC: 12.7 G/DL (ref 13.5–18)
LYMPHOCYTES NFR BLD MANUAL: 30 % (ref 20–51)
MCH RBC QN AUTO: 30 PG (ref 27–31)
MCHC RBC AUTO-ENTMCNC: 34 G/DL (ref 33–37)
MCV RBC AUTO: 89 FL (ref 80–100)
MONOCYTES NFR BLD: 13 % (ref 1.7–9.3)
NEUTS BAND NFR BLD: 8 % (ref 0–10)
NEUTS SEG NFR BLD MANUAL: 47 % (ref 42–75.2)
PLATELET # BLD AUTO: 161 K/MM3 (ref 130–400)
PLATELET BLD QL SMEAR: NORMAL
PMV BLD AUTO: 9.5 FL (ref 7.4–10.4)
POTASSIUM SERPL-SCNC: 3.9 MMOL/L (ref 3.5–4.5)
PROT SERPL-MCNC: 6.6 GM/DL (ref 6.2–8.1)
RBC # BLD AUTO: 4.19 M/MM3 (ref 4.2–5.6)
SODIUM SERPL-SCNC: 141 MMOL/L (ref 136–145)

## 2022-07-14 VITALS — SYSTOLIC BLOOD PRESSURE: 103 MMHG | DIASTOLIC BLOOD PRESSURE: 56 MMHG | TEMPERATURE: 97.9 F | HEART RATE: 76 BPM

## 2022-07-21 VITALS — DIASTOLIC BLOOD PRESSURE: 62 MMHG | TEMPERATURE: 97.3 F | SYSTOLIC BLOOD PRESSURE: 108 MMHG | HEART RATE: 69 BPM

## 2022-07-28 ENCOUNTER — HOSPITAL ENCOUNTER (OUTPATIENT)
Dept: HOSPITAL 19 - EUO | Age: 67
LOS: 3 days | Discharge: HOME | End: 2022-07-31
Payer: MEDICARE

## 2022-07-28 VITALS — BODY MASS INDEX: 28.56 KG/M2 | HEIGHT: 65.98 IN | WEIGHT: 177.69 LBS

## 2022-07-28 VITALS — DIASTOLIC BLOOD PRESSURE: 67 MMHG | SYSTOLIC BLOOD PRESSURE: 107 MMHG | HEART RATE: 83 BPM | TEMPERATURE: 98.4 F

## 2022-07-28 DIAGNOSIS — C18.7: Primary | ICD-10-CM

## 2022-07-28 LAB
ALBUMIN SERPL-MCNC: 3.7 GM/DL (ref 3.4–4.8)
ALP SERPL-CCNC: 63 U/L (ref 40–150)
ALT SERPL-CCNC: 21 U/L (ref 0–55)
ANION GAP SERPL CALC-SCNC: 10 MMOL/L (ref 7–16)
AST SERPL-CCNC: 22 U/L (ref 5–34)
BILIRUB SERPL-MCNC: 0.5 MG/DL (ref 0.2–1.2)
BUN SERPL-MCNC: 13 MG/DL (ref 8–26)
CALCIUM SERPL-MCNC: 8.6 MG/DL (ref 8.4–10.2)
CHLORIDE SERPL-SCNC: 110 MMOL/L (ref 98–107)
CO2 SERPL-SCNC: 20 MMOL/L (ref 23–31)
CREAT SERPL-SCNC: 0.77 MG/DL (ref 0.72–1.25)
EOSINOPHIL NFR BLD: 5 % (ref 0–4)
ERYTHROCYTE [DISTWIDTH] IN BLOOD BY AUTOMATED COUNT: 14.9 % (ref 11.5–14.5)
GLUCOSE SERPL-MCNC: 112 MG/DL (ref 70–99)
HCT VFR BLD AUTO: 37.4 % (ref 42–52)
HGB BLD-MCNC: 12.4 G/DL (ref 13.5–18)
LYMPHOCYTES NFR BLD MANUAL: 37 % (ref 20–51)
MCH RBC QN AUTO: 30 PG (ref 27–31)
MCHC RBC AUTO-ENTMCNC: 33 G/DL (ref 33–37)
MCV RBC AUTO: 91 FL (ref 80–100)
MONOCYTES NFR BLD: 13 % (ref 1.7–9.3)
NEUTS BAND NFR BLD: 5 % (ref 0–10)
NEUTS SEG NFR BLD MANUAL: 40 % (ref 42–75.2)
PLATELET # BLD AUTO: 164 K/MM3 (ref 130–400)
PLATELET BLD QL SMEAR: NORMAL
PMV BLD AUTO: 10.1 FL (ref 7.4–10.4)
POTASSIUM SERPL-SCNC: 3.6 MMOL/L (ref 3.5–4.5)
PROT SERPL-MCNC: 6.3 GM/DL (ref 6.2–8.1)
RBC # BLD AUTO: 4.11 M/MM3 (ref 4.2–5.6)
SODIUM SERPL-SCNC: 140 MMOL/L (ref 136–145)

## 2022-08-04 VITALS — HEART RATE: 79 BPM | TEMPERATURE: 97.8 F | DIASTOLIC BLOOD PRESSURE: 57 MMHG | SYSTOLIC BLOOD PRESSURE: 119 MMHG

## 2022-08-11 VITALS — TEMPERATURE: 97 F | SYSTOLIC BLOOD PRESSURE: 122 MMHG | DIASTOLIC BLOOD PRESSURE: 61 MMHG | HEART RATE: 91 BPM

## 2022-08-18 VITALS — HEART RATE: 93 BPM | TEMPERATURE: 97.8 F | DIASTOLIC BLOOD PRESSURE: 57 MMHG | SYSTOLIC BLOOD PRESSURE: 117 MMHG

## 2022-08-18 LAB
ALBUMIN SERPL-MCNC: 3.7 GM/DL (ref 3.4–4.8)
ALP SERPL-CCNC: 62 U/L (ref 40–150)
ALT SERPL-CCNC: 18 U/L (ref 0–55)
ANION GAP SERPL CALC-SCNC: 10 MMOL/L (ref 7–16)
ANISOCYTOSIS BLD QL: (no result)
AST SERPL-CCNC: 21 U/L (ref 5–34)
BASOPHILS NFR BLD MANUAL: 1 % (ref 0–2)
BILIRUB SERPL-MCNC: 0.7 MG/DL (ref 0.2–1.2)
BUN SERPL-MCNC: 12 MG/DL (ref 8–26)
CALCIUM SERPL-MCNC: 8.9 MG/DL (ref 8.4–10.2)
CHLORIDE SERPL-SCNC: 109 MMOL/L (ref 98–107)
CO2 SERPL-SCNC: 23 MMOL/L (ref 23–31)
CREAT SERPL-SCNC: 0.88 MG/DL (ref 0.72–1.25)
EOSINOPHIL NFR BLD: 2 % (ref 0–4)
ERYTHROCYTE [DISTWIDTH] IN BLOOD BY AUTOMATED COUNT: 15.7 % (ref 11.5–14.5)
GLUCOSE SERPL-MCNC: 141 MG/DL (ref 70–99)
HCT VFR BLD AUTO: 41.1 % (ref 42–52)
HGB BLD-MCNC: 13.9 G/DL (ref 13.5–18)
LYMPHOCYTES NFR BLD MANUAL: 50 % (ref 20–51)
MCH RBC QN AUTO: 31 PG (ref 27–31)
MCHC RBC AUTO-ENTMCNC: 34 G/DL (ref 33–37)
MCV RBC AUTO: 91 FL (ref 80–100)
MONOCYTES NFR BLD: 18 % (ref 1.7–9.3)
NEUTS SEG NFR BLD MANUAL: 29 % (ref 42–75.2)
PLATELET # BLD AUTO: 149 K/MM3 (ref 130–400)
PLATELET BLD QL SMEAR: NORMAL
PMV BLD AUTO: 10.3 FL (ref 7.4–10.4)
POTASSIUM SERPL-SCNC: 3.7 MMOL/L (ref 3.5–4.5)
PROT SERPL-MCNC: 6.7 GM/DL (ref 6.2–8.1)
RBC # BLD AUTO: 4.53 M/MM3 (ref 4.2–5.6)
SODIUM SERPL-SCNC: 142 MMOL/L (ref 136–145)

## 2022-08-25 ENCOUNTER — HOSPITAL ENCOUNTER (OUTPATIENT)
Dept: HOSPITAL 19 - EUO | Age: 67
Discharge: STILL ADMITTED OR ADMINISTRATIVE | End: 2022-08-25
Payer: MEDICARE

## 2022-08-25 VITALS — TEMPERATURE: 97.8 F | HEART RATE: 66 BPM | DIASTOLIC BLOOD PRESSURE: 85 MMHG | SYSTOLIC BLOOD PRESSURE: 130 MMHG

## 2022-08-25 VITALS — BODY MASS INDEX: 28.81 KG/M2 | HEIGHT: 65.98 IN | WEIGHT: 179.24 LBS

## 2022-08-25 DIAGNOSIS — C18.7: Primary | ICD-10-CM

## 2022-08-25 NOTE — NUR
Pt here for PICC removal.Pt has labs due in 2 weeks.Instructed pt to check in
for labs in 2 weeks.Lab order taken to lab.

## 2022-09-01 ENCOUNTER — HOSPITAL ENCOUNTER (OUTPATIENT)
Dept: HOSPITAL 19 - COL.LAB | Age: 67
End: 2022-09-01
Payer: MEDICARE

## 2022-09-01 DIAGNOSIS — C18.7: Primary | ICD-10-CM

## 2022-09-01 LAB
ALBUMIN SERPL-MCNC: 3.8 GM/DL (ref 3.4–4.8)
ALP SERPL-CCNC: 65 U/L (ref 40–150)
ALT SERPL-CCNC: 23 U/L (ref 0–55)
ANION GAP SERPL CALC-SCNC: 9 MMOL/L (ref 7–16)
AST SERPL-CCNC: 24 U/L (ref 5–34)
BILIRUB SERPL-MCNC: 0.7 MG/DL (ref 0.2–1.2)
BUN SERPL-MCNC: 19 MG/DL (ref 8–26)
CALCIUM SERPL-MCNC: 9.4 MG/DL (ref 8.4–10.2)
CHLORIDE SERPL-SCNC: 110 MMOL/L (ref 98–107)
CO2 SERPL-SCNC: 22 MMOL/L (ref 23–31)
CREAT SERPL-SCNC: 0.85 MG/DL (ref 0.72–1.25)
EOSINOPHIL NFR BLD: 3 % (ref 0–4)
ERYTHROCYTE [DISTWIDTH] IN BLOOD BY AUTOMATED COUNT: 15.5 % (ref 11.5–14.5)
GLUCOSE SERPL-MCNC: 109 MG/DL (ref 70–99)
HCT VFR BLD AUTO: 41.9 % (ref 42–52)
HGB BLD-MCNC: 14.1 G/DL (ref 13.5–18)
LYMPHOCYTES NFR BLD MANUAL: 49 % (ref 20–51)
MCH RBC QN AUTO: 30 PG (ref 27–31)
MCHC RBC AUTO-ENTMCNC: 34 G/DL (ref 33–37)
MCV RBC AUTO: 90 FL (ref 80–100)
MONOCYTES NFR BLD: 14 % (ref 1.7–9.3)
NEUTS BAND NFR BLD: 4 % (ref 0–10)
NEUTS SEG NFR BLD MANUAL: 30 % (ref 42–75.2)
PLATELET # BLD AUTO: 135 K/MM3 (ref 130–400)
PLATELET BLD QL SMEAR: NORMAL
PMV BLD AUTO: 10.6 FL (ref 7.4–10.4)
POTASSIUM SERPL-SCNC: 4 MMOL/L (ref 3.5–4.5)
PROT SERPL-MCNC: 6.9 GM/DL (ref 6.2–8.1)
RBC # BLD AUTO: 4.67 M/MM3 (ref 4.2–5.6)
SODIUM SERPL-SCNC: 141 MMOL/L (ref 136–145)

## 2022-09-29 ENCOUNTER — HOSPITAL ENCOUNTER (OUTPATIENT)
Dept: HOSPITAL 19 - COL.LAB | Age: 67
End: 2022-09-29
Attending: FAMILY MEDICINE
Payer: MEDICARE

## 2022-09-29 DIAGNOSIS — C18.7: Primary | ICD-10-CM

## 2022-09-29 LAB
ALBUMIN SERPL-MCNC: 3.9 GM/DL (ref 3.4–4.8)
ALP SERPL-CCNC: 61 U/L (ref 40–150)
ALT SERPL-CCNC: 20 U/L (ref 0–55)
ANION GAP SERPL CALC-SCNC: 11 MMOL/L (ref 7–16)
AST SERPL-CCNC: 28 U/L (ref 5–34)
BILIRUB SERPL-MCNC: 0.5 MG/DL (ref 0.2–1.2)
BUN SERPL-MCNC: 17 MG/DL (ref 8–26)
CALCIUM SERPL-MCNC: 9.4 MG/DL (ref 8.4–10.2)
CHLORIDE SERPL-SCNC: 109 MMOL/L (ref 98–107)
CO2 SERPL-SCNC: 22 MMOL/L (ref 23–31)
CREAT SERPL-SCNC: 0.95 MG/DL (ref 0.72–1.25)
ERYTHROCYTE [DISTWIDTH] IN BLOOD BY AUTOMATED COUNT: 14.5 % (ref 11.5–14.5)
GLUCOSE SERPL-MCNC: 113 MG/DL (ref 70–99)
HCT VFR BLD AUTO: 41.5 % (ref 42–52)
HGB BLD-MCNC: 14.2 G/DL (ref 13.5–18)
LYMPHOCYTES NFR BLD MANUAL: 34 % (ref 20–51)
MCH RBC QN AUTO: 30 PG (ref 27–31)
MCHC RBC AUTO-ENTMCNC: 34 G/DL (ref 33–37)
MCV RBC AUTO: 88 FL (ref 80–100)
MONOCYTES NFR BLD: 13 % (ref 1.7–9.3)
NEUTS SEG NFR BLD MANUAL: 53 % (ref 42–75.2)
PLATELET # BLD AUTO: 188 K/MM3 (ref 130–400)
PLATELET BLD QL SMEAR: NORMAL
PMV BLD AUTO: 9.8 FL (ref 7.4–10.4)
POTASSIUM SERPL-SCNC: 4 MMOL/L (ref 3.5–4.5)
PROT SERPL-MCNC: 7.1 GM/DL (ref 6.2–8.1)
RBC # BLD AUTO: 4.73 M/MM3 (ref 4.2–5.6)
SODIUM SERPL-SCNC: 142 MMOL/L (ref 136–145)

## 2022-10-27 ENCOUNTER — HOSPITAL ENCOUNTER (OUTPATIENT)
Dept: HOSPITAL 19 - COL.LAB | Age: 67
End: 2022-10-27
Payer: MEDICARE

## 2022-10-27 DIAGNOSIS — C18.7: Primary | ICD-10-CM

## 2022-10-27 LAB
ALBUMIN SERPL-MCNC: 3.9 GM/DL (ref 3.4–4.8)
ALP SERPL-CCNC: 56 U/L (ref 40–150)
ALT SERPL-CCNC: 33 U/L (ref 0–55)
ANION GAP SERPL CALC-SCNC: 10 MMOL/L (ref 7–16)
AST SERPL-CCNC: 35 U/L (ref 5–34)
BILIRUB SERPL-MCNC: 0.7 MG/DL (ref 0.2–1.2)
BUN SERPL-MCNC: 21 MG/DL (ref 8–26)
CALCIUM SERPL-MCNC: 9.4 MG/DL (ref 8.4–10.2)
CHLORIDE SERPL-SCNC: 108 MMOL/L (ref 98–107)
CO2 SERPL-SCNC: 22 MMOL/L (ref 23–31)
CREAT SERPL-SCNC: 0.98 MG/DL (ref 0.72–1.25)
ERYTHROCYTE [DISTWIDTH] IN BLOOD BY AUTOMATED COUNT: 14.9 % (ref 11.5–14.5)
GLUCOSE SERPL-MCNC: 107 MG/DL (ref 70–99)
HCT VFR BLD AUTO: 43.4 % (ref 42–52)
HGB BLD-MCNC: 14.8 G/DL (ref 13.5–18)
MCH RBC QN AUTO: 30 PG (ref 27–31)
MCHC RBC AUTO-ENTMCNC: 34 G/DL (ref 33–37)
MCV RBC AUTO: 89 FL (ref 80–100)
PLATELET # BLD AUTO: 166 K/MM3 (ref 130–400)
PMV BLD AUTO: 10.1 FL (ref 7.4–10.4)
POTASSIUM SERPL-SCNC: 4.1 MMOL/L (ref 3.5–4.5)
PROT SERPL-MCNC: 7.6 GM/DL (ref 6.2–8.1)
RBC # BLD AUTO: 4.9 M/MM3 (ref 4.2–5.6)
SODIUM SERPL-SCNC: 140 MMOL/L (ref 136–145)

## 2022-11-05 ENCOUNTER — HOSPITAL ENCOUNTER (EMERGENCY)
Dept: HOSPITAL 19 - COL.ER | Age: 67
Discharge: HOME | End: 2022-11-05
Attending: STUDENT IN AN ORGANIZED HEALTH CARE EDUCATION/TRAINING PROGRAM
Payer: MEDICARE

## 2022-11-05 VITALS — TEMPERATURE: 97.8 F

## 2022-11-05 VITALS — WEIGHT: 185.41 LBS | HEIGHT: 65.98 IN | BODY MASS INDEX: 29.8 KG/M2

## 2022-11-05 VITALS — SYSTOLIC BLOOD PRESSURE: 118 MMHG | HEART RATE: 81 BPM | DIASTOLIC BLOOD PRESSURE: 76 MMHG

## 2022-11-05 DIAGNOSIS — Z28.310: ICD-10-CM

## 2022-11-05 DIAGNOSIS — C18.9: ICD-10-CM

## 2022-11-05 DIAGNOSIS — R60.0: Primary | ICD-10-CM

## 2022-11-05 LAB
ALBUMIN SERPL-MCNC: 3.9 GM/DL (ref 3.4–4.8)
ALP SERPL-CCNC: 63 U/L (ref 40–150)
ALT SERPL-CCNC: 34 U/L (ref 0–55)
ANION GAP SERPL CALC-SCNC: 11 MMOL/L (ref 7–16)
AST SERPL-CCNC: 30 U/L (ref 5–34)
BILIRUB SERPL-MCNC: 0.5 MG/DL (ref 0.2–1.2)
BUN SERPL-MCNC: 16 MG/DL (ref 8–26)
CALCIUM SERPL-MCNC: 9.7 MG/DL (ref 8.4–10.2)
CHLORIDE SERPL-SCNC: 106 MMOL/L (ref 98–107)
CO2 SERPL-SCNC: 24 MMOL/L (ref 23–31)
CREAT SERPL-SCNC: 0.88 MG/DL (ref 0.72–1.25)
GLUCOSE SERPL-MCNC: 108 MG/DL (ref 70–99)
POTASSIUM SERPL-SCNC: 3.7 MMOL/L (ref 3.5–4.5)
PROT SERPL-MCNC: 7.5 GM/DL (ref 6.2–8.1)
SODIUM SERPL-SCNC: 141 MMOL/L (ref 136–145)

## 2023-02-11 ENCOUNTER — HOSPITAL ENCOUNTER (EMERGENCY)
Dept: HOSPITAL 19 - COL.ER | Age: 68
Discharge: HOME | End: 2023-02-11
Attending: FAMILY MEDICINE
Payer: MEDICARE

## 2023-02-11 VITALS — DIASTOLIC BLOOD PRESSURE: 73 MMHG | SYSTOLIC BLOOD PRESSURE: 108 MMHG

## 2023-02-11 VITALS — HEART RATE: 100 BPM

## 2023-02-11 VITALS — BODY MASS INDEX: 26.57 KG/M2 | WEIGHT: 165.35 LBS | HEIGHT: 65.98 IN

## 2023-02-11 VITALS — TEMPERATURE: 98.3 F

## 2023-02-11 DIAGNOSIS — L89.152: Primary | ICD-10-CM

## 2023-02-11 DIAGNOSIS — Z28.310: ICD-10-CM

## 2023-02-11 DIAGNOSIS — L89.302: ICD-10-CM

## 2023-02-11 LAB
ALBUMIN SERPL-MCNC: 2.7 GM/DL (ref 3.4–4.8)
ALP SERPL-CCNC: 93 U/L (ref 40–150)
ALT SERPL-CCNC: 14 U/L (ref 0–55)
ANION GAP SERPL CALC-SCNC: 21 MMOL/L (ref 7–16)
AST SERPL-CCNC: 38 U/L (ref 5–34)
BASOPHILS # BLD: 0 K/MM3 (ref 0–0.2)
BASOPHILS NFR BLD AUTO: 0.3 % (ref 0–2)
BILIRUB SERPL-MCNC: 1 MG/DL (ref 0.2–1.2)
BUN SERPL-MCNC: 6 MG/DL (ref 8–26)
CALCIUM SERPL-MCNC: 8.8 MG/DL (ref 8.4–10.2)
CHLORIDE SERPL-SCNC: 97 MMOL/L (ref 98–107)
CO2 SERPL-SCNC: 22 MMOL/L (ref 23–31)
CREAT SERPL-SCNC: 0.52 MG/DL (ref 0.72–1.25)
CRP SERPL-MCNC: 16.32 MG/DL (ref 0–0.5)
EOSINOPHIL # BLD: 0.1 K/MM3 (ref 0–0.7)
EOSINOPHIL NFR BLD: 0.5 % (ref 0–4)
ERYTHROCYTE [DISTWIDTH] IN BLOOD BY AUTOMATED COUNT: 16.7 % (ref 11.5–14.5)
GLUCOSE SERPL-MCNC: 148 MG/DL (ref 70–99)
GRANULOCYTES # BLD AUTO: 67.6 % (ref 42.2–75.2)
HCT VFR BLD AUTO: 43.3 % (ref 42–52)
HGB BLD-MCNC: 14.2 G/DL (ref 13.5–18)
LYMPHOCYTES # BLD: 2.3 K/MM3 (ref 1.2–3.4)
LYMPHOCYTES NFR BLD: 22.1 % (ref 20–51)
MCH RBC QN AUTO: 27 PG (ref 27–31)
MCHC RBC AUTO-ENTMCNC: 33 G/DL (ref 33–37)
MCV RBC AUTO: 83 FL (ref 80–100)
MONOCYTES # BLD: 0.9 K/MM3 (ref 0.1–0.6)
MONOCYTES NFR BLD AUTO: 8.7 % (ref 1.7–9.3)
NEUTROPHILS # BLD: 6.9 K/MM3 (ref 1.4–6.5)
PLATELET # BLD AUTO: 317 K/MM3 (ref 130–400)
PMV BLD AUTO: 8.9 FL (ref 7.4–10.4)
POTASSIUM SERPL-SCNC: 3.9 MMOL/L (ref 3.5–4.5)
PROT SERPL-MCNC: 7.7 GM/DL (ref 6.2–8.1)
RBC # BLD AUTO: 5.23 M/MM3 (ref 4.2–5.6)
SODIUM SERPL-SCNC: 140 MMOL/L (ref 136–145)

## 2023-02-13 ENCOUNTER — HOSPITAL ENCOUNTER (INPATIENT)
Dept: HOSPITAL 19 - COL.ER | Age: 68
LOS: 22 days | Discharge: HOME HEALTH | DRG: 853 | End: 2023-03-07
Attending: STUDENT IN AN ORGANIZED HEALTH CARE EDUCATION/TRAINING PROGRAM | Admitting: STUDENT IN AN ORGANIZED HEALTH CARE EDUCATION/TRAINING PROGRAM
Payer: MEDICARE

## 2023-02-13 VITALS — WEIGHT: 151.02 LBS | BODY MASS INDEX: 24.27 KG/M2 | HEIGHT: 65.98 IN

## 2023-02-13 VITALS — DIASTOLIC BLOOD PRESSURE: 55 MMHG | HEART RATE: 93 BPM | SYSTOLIC BLOOD PRESSURE: 98 MMHG

## 2023-02-13 VITALS — TEMPERATURE: 97.8 F | DIASTOLIC BLOOD PRESSURE: 53 MMHG | SYSTOLIC BLOOD PRESSURE: 111 MMHG | HEART RATE: 88 BPM

## 2023-02-13 VITALS — SYSTOLIC BLOOD PRESSURE: 92 MMHG | HEART RATE: 87 BPM | DIASTOLIC BLOOD PRESSURE: 55 MMHG

## 2023-02-13 VITALS — HEART RATE: 80 BPM | DIASTOLIC BLOOD PRESSURE: 51 MMHG | SYSTOLIC BLOOD PRESSURE: 105 MMHG

## 2023-02-13 VITALS — SYSTOLIC BLOOD PRESSURE: 98 MMHG | HEART RATE: 88 BPM | DIASTOLIC BLOOD PRESSURE: 48 MMHG

## 2023-02-13 VITALS — HEART RATE: 87 BPM | SYSTOLIC BLOOD PRESSURE: 92 MMHG | DIASTOLIC BLOOD PRESSURE: 55 MMHG

## 2023-02-13 VITALS — DIASTOLIC BLOOD PRESSURE: 56 MMHG | SYSTOLIC BLOOD PRESSURE: 105 MMHG | HEART RATE: 93 BPM

## 2023-02-13 VITALS — DIASTOLIC BLOOD PRESSURE: 59 MMHG | HEART RATE: 107 BPM | SYSTOLIC BLOOD PRESSURE: 111 MMHG | TEMPERATURE: 98.1 F

## 2023-02-13 VITALS — TEMPERATURE: 97.8 F | DIASTOLIC BLOOD PRESSURE: 50 MMHG | HEART RATE: 95 BPM | SYSTOLIC BLOOD PRESSURE: 97 MMHG

## 2023-02-13 VITALS — HEART RATE: 82 BPM | DIASTOLIC BLOOD PRESSURE: 53 MMHG | SYSTOLIC BLOOD PRESSURE: 111 MMHG

## 2023-02-13 VITALS — DIASTOLIC BLOOD PRESSURE: 52 MMHG | HEART RATE: 82 BPM | SYSTOLIC BLOOD PRESSURE: 103 MMHG

## 2023-02-13 VITALS — DIASTOLIC BLOOD PRESSURE: 50 MMHG | SYSTOLIC BLOOD PRESSURE: 97 MMHG | HEART RATE: 95 BPM

## 2023-02-13 DIAGNOSIS — M54.9: ICD-10-CM

## 2023-02-13 DIAGNOSIS — L89.313: ICD-10-CM

## 2023-02-13 DIAGNOSIS — G93.41: ICD-10-CM

## 2023-02-13 DIAGNOSIS — Z87.39: ICD-10-CM

## 2023-02-13 DIAGNOSIS — L03.312: ICD-10-CM

## 2023-02-13 DIAGNOSIS — C79.51: ICD-10-CM

## 2023-02-13 DIAGNOSIS — B95.2: ICD-10-CM

## 2023-02-13 DIAGNOSIS — B96.89: ICD-10-CM

## 2023-02-13 DIAGNOSIS — D64.9: ICD-10-CM

## 2023-02-13 DIAGNOSIS — A42.9: ICD-10-CM

## 2023-02-13 DIAGNOSIS — L89.154: ICD-10-CM

## 2023-02-13 DIAGNOSIS — A41.59: Primary | ICD-10-CM

## 2023-02-13 DIAGNOSIS — Z74.01: ICD-10-CM

## 2023-02-13 DIAGNOSIS — L89.620: ICD-10-CM

## 2023-02-13 DIAGNOSIS — G82.20: ICD-10-CM

## 2023-02-13 DIAGNOSIS — Z85.038: ICD-10-CM

## 2023-02-13 DIAGNOSIS — Z85.118: ICD-10-CM

## 2023-02-13 DIAGNOSIS — G89.29: ICD-10-CM

## 2023-02-13 DIAGNOSIS — B95.61: ICD-10-CM

## 2023-02-13 DIAGNOSIS — E87.6: ICD-10-CM

## 2023-02-13 DIAGNOSIS — L89.324: ICD-10-CM

## 2023-02-13 LAB
ALBUMIN SERPL-MCNC: 2.4 GM/DL (ref 3.4–4.8)
ALP SERPL-CCNC: 99 U/L (ref 40–150)
ALT SERPL-CCNC: 14 U/L (ref 0–55)
ANION GAP SERPL CALC-SCNC: 16 MMOL/L (ref 7–16)
AST SERPL-CCNC: 37 U/L (ref 5–34)
BASOPHILS # BLD: 0 K/MM3 (ref 0–0.2)
BASOPHILS NFR BLD AUTO: 0.3 % (ref 0–2)
BILIRUB SERPL-MCNC: 1.2 MG/DL (ref 0.2–1.2)
BUN SERPL-MCNC: 6 MG/DL (ref 8–26)
CALCIUM SERPL-MCNC: 8.5 MG/DL (ref 8.4–10.2)
CHLORIDE SERPL-SCNC: 98 MMOL/L (ref 98–107)
CO2 SERPL-SCNC: 18 MMOL/L (ref 23–31)
CREAT SERPL-SCNC: 0.46 MG/DL (ref 0.72–1.25)
EOSINOPHIL # BLD: 0 K/MM3 (ref 0–0.7)
EOSINOPHIL NFR BLD: 0.1 % (ref 0–4)
ERYTHROCYTE [DISTWIDTH] IN BLOOD BY AUTOMATED COUNT: 16.5 % (ref 11.5–14.5)
GLUCOSE SERPL-MCNC: 116 MG/DL (ref 70–99)
GRANULOCYTES # BLD AUTO: 82.8 % (ref 42.2–75.2)
HCT VFR BLD AUTO: 37.7 % (ref 42–52)
HGB BLD-MCNC: 12.3 G/DL (ref 13.5–18)
KETONES UR STRIP.AUTO-MCNC: (no result) MG/DL
LIPASE SERPL-CCNC: < 4 U/L (ref 8–78)
LYMPHOCYTES # BLD: 0.8 K/MM3 (ref 1.2–3.4)
LYMPHOCYTES NFR BLD: 7.3 % (ref 20–51)
MCH RBC QN AUTO: 27 PG (ref 27–31)
MCHC RBC AUTO-ENTMCNC: 33 G/DL (ref 33–37)
MCV RBC AUTO: 82 FL (ref 80–100)
MONOCYTES # BLD: 0.9 K/MM3 (ref 0.1–0.6)
MONOCYTES NFR BLD AUTO: 8.9 % (ref 1.7–9.3)
NEUTROPHILS # BLD: 8.7 K/MM3 (ref 1.4–6.5)
PH UR STRIP.AUTO: 6 [PH] (ref 5–8.5)
PLATELET # BLD AUTO: 228 K/MM3 (ref 130–400)
PMV BLD AUTO: 9 FL (ref 7.4–10.4)
POTASSIUM SERPL-SCNC: 3.2 MMOL/L (ref 3.5–4.5)
PROT SERPL-MCNC: 7 GM/DL (ref 6.2–8.1)
RBC # BLD AUTO: 4.59 M/MM3 (ref 4.2–5.6)
RBC # UR: (no result) /HPF (ref 0–2)
SODIUM SERPL-SCNC: 132 MMOL/L (ref 136–145)
SP GR UR STRIP.AUTO: >=1.03 (ref 1–1.03)
UA DIPSTICK PNL UR STRIP.AUTO: (no result)
URN COLLECT METHOD CLASS: (no result)
UROBILINOGEN UR STRIP.AUTO-MCNC: 0.2 E.U/DL (ref 0.2–1)
WBC # UR: (no result) /HPF (ref 0–2)

## 2023-02-13 PROCEDURE — 0JB70ZZ EXCISION OF BACK SUBCUTANEOUS TISSUE AND FASCIA, OPEN APPROACH: ICD-10-PCS | Performed by: SURGERY

## 2023-02-13 PROCEDURE — C1751 CATH, INF, PER/CENT/MIDLINE: HCPCS

## 2023-02-13 PROCEDURE — A4314 CATH W/DRAINAGE 2-WAY LATEX: HCPCS

## 2023-02-13 NOTE — NUR
"""cent/inf SPK OU today.  "" [facility] called this morning to request status of patient admit. This nurse
returned call to let them know that the pt is an inpatient.

## 2023-02-13 NOTE — NUR
Patient returns from surgery at this time. He is alert and oriented although
drowsy. All vitals within normal limits. He is receiving 3L oxygen via nasal
cannula. IVF hanging to gravity tubing at this time. Orders received from Dr. Briones to apply mepilex dressing to sacral wounds.

## 2023-02-13 NOTE — NUR
Vancomycin Initial Dosing Pharmacy Note
Ordering provider: Nick Ferguson MD
Indication/duration: CELLULITIS
LABS: TMAX 101.5, WBC 10.5, SCR 0.5, GFR>100
Recommendation: VANCOMYCIN ~10 MG/KG
Loading dose: 1.5 grams
Maintenance dose: 750 mg every 12 hours
Trough goal: 10 ug/mL

## 2023-02-13 NOTE — NUR
Dr. Briones in for consultation- using interpreting service.  Plan is for
surgery tonight. Morphine administered for pain. Pt unable to use numeric
scale but pt is groaning/moaning and stating he has pain.

## 2023-02-13 NOTE — NUR
Pt rec'd to room 352. A/O x4. Pt's primary language is New Zealander but is able to
understand and speak some English. Daughter at bedside to interpret.  Oriented
to room, call light, phone and dining services.

## 2023-02-13 NOTE — NUR
Pt to OR via bed. LR infusing at 30ml/hr to PICC line. Consent signed using
the interpreting services. Daughter and MAICOL at bedside.

## 2023-02-13 NOTE — NUR
Pt not able to use numeric scale for c/o pain but is moaning/groaning and c/o
pain. Percocet administered po.

## 2023-02-14 VITALS — SYSTOLIC BLOOD PRESSURE: 120 MMHG | TEMPERATURE: 98.3 F | DIASTOLIC BLOOD PRESSURE: 57 MMHG | HEART RATE: 86 BPM

## 2023-02-14 VITALS — TEMPERATURE: 97.4 F | SYSTOLIC BLOOD PRESSURE: 112 MMHG | DIASTOLIC BLOOD PRESSURE: 58 MMHG | HEART RATE: 94 BPM

## 2023-02-14 VITALS — HEART RATE: 97 BPM | SYSTOLIC BLOOD PRESSURE: 94 MMHG | TEMPERATURE: 98.3 F | DIASTOLIC BLOOD PRESSURE: 50 MMHG

## 2023-02-14 VITALS — SYSTOLIC BLOOD PRESSURE: 112 MMHG | HEART RATE: 94 BPM | DIASTOLIC BLOOD PRESSURE: 57 MMHG | TEMPERATURE: 97.5 F

## 2023-02-14 VITALS — TEMPERATURE: 97.4 F | HEART RATE: 97 BPM | DIASTOLIC BLOOD PRESSURE: 66 MMHG | SYSTOLIC BLOOD PRESSURE: 127 MMHG

## 2023-02-14 VITALS — TEMPERATURE: 97.7 F | DIASTOLIC BLOOD PRESSURE: 74 MMHG | SYSTOLIC BLOOD PRESSURE: 119 MMHG | HEART RATE: 102 BPM

## 2023-02-14 LAB
ANION GAP SERPL CALC-SCNC: 11 MMOL/L (ref 7–16)
BASOPHILS # BLD: 0 K/MM3 (ref 0–0.2)
BASOPHILS NFR BLD AUTO: 0.2 % (ref 0–2)
BUN SERPL-MCNC: 5 MG/DL (ref 8–26)
CALCIUM SERPL-MCNC: 7.8 MG/DL (ref 8.4–10.2)
CHLORIDE SERPL-SCNC: 104 MMOL/L (ref 98–107)
CO2 SERPL-SCNC: 23 MMOL/L (ref 23–31)
CREAT SERPL-SCNC: 0.38 MG/DL (ref 0.72–1.25)
EOSINOPHIL # BLD: 0 K/MM3 (ref 0–0.7)
EOSINOPHIL NFR BLD: 0.4 % (ref 0–4)
ERYTHROCYTE [DISTWIDTH] IN BLOOD BY AUTOMATED COUNT: 16.6 % (ref 11.5–14.5)
GLUCOSE SERPL-MCNC: 98 MG/DL (ref 70–99)
GRANULOCYTES # BLD AUTO: 76.1 % (ref 42.2–75.2)
HCT VFR BLD AUTO: 31.7 % (ref 42–52)
HGB BLD-MCNC: 10.3 G/DL (ref 13.5–18)
LYMPHOCYTES # BLD: 1.2 K/MM3 (ref 1.2–3.4)
LYMPHOCYTES NFR BLD: 11.8 % (ref 20–51)
MCH RBC QN AUTO: 27 PG (ref 27–31)
MCHC RBC AUTO-ENTMCNC: 33 G/DL (ref 33–37)
MCV RBC AUTO: 82 FL (ref 80–100)
MONOCYTES # BLD: 1 K/MM3 (ref 0.1–0.6)
MONOCYTES NFR BLD AUTO: 10.5 % (ref 1.7–9.3)
NEUTROPHILS # BLD: 7.4 K/MM3 (ref 1.4–6.5)
PLATELET # BLD AUTO: 182 K/MM3 (ref 130–400)
PMV BLD AUTO: 9.2 FL (ref 7.4–10.4)
POTASSIUM SERPL-SCNC: 2.8 MMOL/L (ref 3.5–4.5)
RBC # BLD AUTO: 3.85 M/MM3 (ref 4.2–5.6)
SODIUM SERPL-SCNC: 138 MMOL/L (ref 136–145)

## 2023-02-14 NOTE — NUR
The patient was admitted for sepsis. The patient has colon cancer with mets to
the bone and lungs. He also has large state 4 and 3 decubitus ulcers in the
lower back. A palliative care consult was ordered. The patient resides in
[place] with his daughter, Aubree Leggett (ph#[phone]), Aubree's
 and child, and the patient's wife. The patient's family provides all
care to the patient. BETHANY Eric Case Manager, met with the patient's daughter.
 staffed with Jeaneth. Aubree plans to talk to the patient about his code
status. At this time, they are not interested or looking at hospice care.
 
*Discharge plan: Undetermined at this time*

## 2023-02-14 NOTE — NUR
Late Entry.
Rounded with Dr. Ferguson at 1020 using  [id number]. No family at
bedside. Pt asked if he had improve since last admit. Dr. Ferguson repsonded
and told pt that in fact his situation has gotten worse and that he would
continue to be readmitted for progression of wounds. Pain still remains-
Fentanyl patch added to medications. When asked about Code Status and moving
to Hospice pt responded that he would have to talk with his daughter before
making a decision. Pt remains a Full Code. Pt also asked if he had further
options for treatment and Dr. Ferguson stated at this time we would given him
antibiotics for his wounds but that other than that he did not see options.

## 2023-02-14 NOTE — NUR
Initial visit; Patient thanked  many times for offering Spiritual Care
though he was drowsy he appeared to be comfortable and glad  was
present.  will keep Ammon in her prayers.

## 2023-02-14 NOTE — NUR
THIS RN RECIEVED CALL FROM NURSING STUDENT, THAT MARISELA REPORTED TO HER CHEST
PAIN 7-8/10. THIS RN WAS IN ANOTHER ROOM ON CONTACT PRECUTIONS. THIS RN CALLED
CHARGE NURSE WHOM CHECKED ON PATIENT, AND CALLED DR VALLECILLO. PER CHARGE BETHANY FERGUSON HAS ORDERED EKG, AND STAT TROP.

## 2023-02-14 NOTE — NUR
PATIENT AND DAUGHTER CONTINUE TO COMPLAINT THAT THE PATIENT HAS DIARRHEA
(OUTPUT THROUGH COLOSTOMY). THIS RN HAS EDUCATED THEM MULTIPLE TIMES THAT
WMN HAD VERY HARD FORMED STOOL, SO MUCH SO THAT THE COLOSTOMY WAFER RISED
FROM SKIN AND DID NOT LET IT GO DOWN INTO THE BAG. CHARGE RN WITNESSED THIS.
PATIENT DOES NOT SEEM TO TAKE DIRECTON WELL.

## 2023-02-14 NOTE — NUR
MD SEEN PATIENTS DAUGHTER AROUND 1400 FOR UPDATE ON PATIENT STATUS AND PLAN. SHAUN CHRISTY  CURRENTLY IN PATIENT ROOM SPEAKING WITH PATIENTS DAUGHTER
AT THIS TIME.

## 2023-02-14 NOTE — NUR
I met with pt's daughter, Aubree, and discussed code status and GOC. I
explained to daughter what a Code Blue would look like as far as
CPR/Intubation. Aubree stated she and her  had researched this and she
agreed it would be difficult for her father to endure. We will meet tomorrow
to speak with pt and her concerning code status. I had daughter review her
thoughts on her dad. " He got sick so quickly even though we were keeping
close watch." I instructed her that in her fathers state and with his age his
status can change rapidly even with the best of care. The daughter is waking
up every 2 hours when he is at home to check on him. They have also gone to
great lengths to obtain equipment for his care as well as using HH. I
encouraged daughter to consider what it would look like to have pt in comfort
care where she could focus her energy on just being with the pt. She stated
she wanted to make the appointment with his Vitamin doctor before thinking
about Hospice.

## 2023-02-15 VITALS — SYSTOLIC BLOOD PRESSURE: 113 MMHG | HEART RATE: 91 BPM | DIASTOLIC BLOOD PRESSURE: 53 MMHG | TEMPERATURE: 97.8 F

## 2023-02-15 VITALS — HEART RATE: 93 BPM | SYSTOLIC BLOOD PRESSURE: 123 MMHG | DIASTOLIC BLOOD PRESSURE: 54 MMHG | TEMPERATURE: 98.1 F

## 2023-02-15 VITALS — TEMPERATURE: 98 F | HEART RATE: 100 BPM | SYSTOLIC BLOOD PRESSURE: 124 MMHG | DIASTOLIC BLOOD PRESSURE: 77 MMHG

## 2023-02-15 VITALS — TEMPERATURE: 97.6 F | DIASTOLIC BLOOD PRESSURE: 64 MMHG | HEART RATE: 93 BPM | SYSTOLIC BLOOD PRESSURE: 121 MMHG

## 2023-02-15 VITALS — HEART RATE: 88 BPM | DIASTOLIC BLOOD PRESSURE: 79 MMHG | SYSTOLIC BLOOD PRESSURE: 124 MMHG | TEMPERATURE: 97.9 F

## 2023-02-15 VITALS — HEART RATE: 91 BPM | DIASTOLIC BLOOD PRESSURE: 81 MMHG | TEMPERATURE: 97.5 F | SYSTOLIC BLOOD PRESSURE: 127 MMHG

## 2023-02-15 LAB
ANION GAP SERPL CALC-SCNC: 10 MMOL/L (ref 7–16)
BASOPHILS # BLD: 0 K/MM3 (ref 0–0.2)
BASOPHILS NFR BLD AUTO: 0.3 % (ref 0–2)
BUN SERPL-MCNC: 5 MG/DL (ref 8–26)
CALCIUM SERPL-MCNC: 7.9 MG/DL (ref 8.4–10.2)
CHLORIDE SERPL-SCNC: 103 MMOL/L (ref 98–107)
CO2 SERPL-SCNC: 24 MMOL/L (ref 23–31)
CREAT SERPL-SCNC: 0.4 MG/DL (ref 0.72–1.25)
EOSINOPHIL # BLD: 0.2 K/MM3 (ref 0–0.7)
EOSINOPHIL NFR BLD: 1.6 % (ref 0–4)
ERYTHROCYTE [DISTWIDTH] IN BLOOD BY AUTOMATED COUNT: 16.9 % (ref 11.5–14.5)
GLUCOSE SERPL-MCNC: 144 MG/DL (ref 70–99)
GRANULOCYTES # BLD AUTO: 65.2 % (ref 42.2–75.2)
HCT VFR BLD AUTO: 33.8 % (ref 42–52)
HGB BLD-MCNC: 11 G/DL (ref 13.5–18)
LYMPHOCYTES # BLD: 2 K/MM3 (ref 1.2–3.4)
LYMPHOCYTES NFR BLD: 21.3 % (ref 20–51)
MCH RBC QN AUTO: 27 PG (ref 27–31)
MCHC RBC AUTO-ENTMCNC: 33 G/DL (ref 33–37)
MCV RBC AUTO: 83 FL (ref 80–100)
MONOCYTES # BLD: 1 K/MM3 (ref 0.1–0.6)
MONOCYTES NFR BLD AUTO: 11 % (ref 1.7–9.3)
NEUTROPHILS # BLD: 6 K/MM3 (ref 1.4–6.5)
PLATELET # BLD AUTO: 197 K/MM3 (ref 130–400)
PMV BLD AUTO: 9.1 FL (ref 7.4–10.4)
POTASSIUM SERPL-SCNC: 3 MMOL/L (ref 3.5–4.5)
RBC # BLD AUTO: 4.08 M/MM3 (ref 4.2–5.6)
SODIUM SERPL-SCNC: 137 MMOL/L (ref 136–145)

## 2023-02-15 NOTE — NUR
COLOSTOMY STOOL HARD AND FORMED, RUINED COLOSTMY WAFER. PATIENT ENTIRE
COLOSTOMY BAG AND WAFER CHANGED.
MD CALLED TO RESTART PATIENT HOME STOOL SOFTENER.

## 2023-02-15 NOTE — NUR
I met with Daughter-Aubree and pt. Interpretor Anjali #[id number] was used. I
explained to the pt what was involved with a full code and reviewed his case.
Pt stated he understood and wanted to continue being a Full Code with both CPR
and Intubation. Daughter had expressed concern that this would be very hard
for the pt but stated it was his decision. Dr. Ferguson notified of pt's
stance.

## 2023-02-15 NOTE — NUR
PATIENT SEEN BY WOUND CARE NP NATALIE GALLEGOS SACRAL WOUNDS AND L HEEL WOUND CHANGED
BY WOUND CARE NP.
NEW DAILY DRESSING CHANGE ORDERS INPUT.

## 2023-02-15 NOTE — NUR
PATIENT ASLEEP, EASILY AROUSABLE TO NAME. PATIENT DENIES ANY NEEDS OR
COMPLAINS AT THIS TIME.
CALL LIGHT WTITH IN REACH. PATIENT REQUESTED BELONIGNS WITH IN REACH.

## 2023-02-15 NOTE — NUR
CALL RECIEVED FROM ULTRASOUND, UNABLE TO DO THORACENTESIS TONIGHT. PATIENT
WILL HOPEFULLY BE DONE AROUND 0800 ON 2/16/2023.

## 2023-02-16 VITALS — DIASTOLIC BLOOD PRESSURE: 56 MMHG | HEART RATE: 108 BPM | TEMPERATURE: 97.5 F | SYSTOLIC BLOOD PRESSURE: 118 MMHG

## 2023-02-16 VITALS — HEART RATE: 94 BPM | TEMPERATURE: 97.9 F | DIASTOLIC BLOOD PRESSURE: 61 MMHG | SYSTOLIC BLOOD PRESSURE: 118 MMHG

## 2023-02-16 VITALS — SYSTOLIC BLOOD PRESSURE: 114 MMHG | TEMPERATURE: 98.2 F | HEART RATE: 94 BPM | DIASTOLIC BLOOD PRESSURE: 59 MMHG

## 2023-02-16 VITALS — HEART RATE: 96 BPM | DIASTOLIC BLOOD PRESSURE: 56 MMHG | TEMPERATURE: 98 F | SYSTOLIC BLOOD PRESSURE: 121 MMHG

## 2023-02-16 VITALS — HEART RATE: 103 BPM | SYSTOLIC BLOOD PRESSURE: 104 MMHG | TEMPERATURE: 98.2 F | DIASTOLIC BLOOD PRESSURE: 50 MMHG

## 2023-02-16 LAB
ANION GAP SERPL CALC-SCNC: 12 MMOL/L (ref 7–16)
BASOPHILS # BLD: 0 K/MM3 (ref 0–0.2)
BASOPHILS NFR BLD AUTO: 0.3 % (ref 0–2)
BUN SERPL-MCNC: 4 MG/DL (ref 8–26)
CALCIUM SERPL-MCNC: 8.3 MG/DL (ref 8.4–10.2)
CHLORIDE SERPL-SCNC: 105 MMOL/L (ref 98–107)
CO2 SERPL-SCNC: 22 MMOL/L (ref 23–31)
CREAT SERPL-SCNC: 0.4 MG/DL (ref 0.72–1.25)
EOSINOPHIL # BLD: 0.1 K/MM3 (ref 0–0.7)
EOSINOPHIL NFR BLD: 0.8 % (ref 0–4)
ERYTHROCYTE [DISTWIDTH] IN BLOOD BY AUTOMATED COUNT: 17.2 % (ref 11.5–14.5)
GLUCOSE SERPL-MCNC: 99 MG/DL (ref 70–99)
GRANULOCYTES # BLD AUTO: 64.6 % (ref 42.2–75.2)
HCT VFR BLD AUTO: 35.8 % (ref 42–52)
HGB BLD-MCNC: 11.7 G/DL (ref 13.5–18)
LYMPHOCYTES # BLD: 2.4 K/MM3 (ref 1.2–3.4)
LYMPHOCYTES NFR BLD: 24.8 % (ref 20–51)
MCH RBC QN AUTO: 27 PG (ref 27–31)
MCHC RBC AUTO-ENTMCNC: 33 G/DL (ref 33–37)
MCV RBC AUTO: 82 FL (ref 80–100)
MONOCYTES # BLD: 0.8 K/MM3 (ref 0.1–0.6)
MONOCYTES NFR BLD AUTO: 8.7 % (ref 1.7–9.3)
NEUTROPHILS # BLD: 6.2 K/MM3 (ref 1.4–6.5)
PLATELET # BLD AUTO: 236 K/MM3 (ref 130–400)
PMV BLD AUTO: 9.3 FL (ref 7.4–10.4)
POTASSIUM SERPL-SCNC: 3.2 MMOL/L (ref 3.5–4.5)
RBC # BLD AUTO: 4.39 M/MM3 (ref 4.2–5.6)
SODIUM SERPL-SCNC: 139 MMOL/L (ref 136–145)

## 2023-02-16 NOTE — NUR
Pt daughter calls for update.  Reports pt has poor appetite at home.  Wife
makes food per request.  Eats soft/shredded diet and encouraged to drink
protien drinks if refusing food and with medications.  Informed daughter that
specialty bed is available through [facility] and that provider has been
notified.

## 2023-02-16 NOTE — NUR
Dressing to coccyx changed.  Old dressing removed revealing alginate from
inside wound.  Alginate saturated with brown drainage.  No active drainage
noted from wound.  Wound bed pink.  Packed wound with alginate, applied
allevyn dressing.

## 2023-02-17 VITALS — HEART RATE: 93 BPM | SYSTOLIC BLOOD PRESSURE: 103 MMHG | TEMPERATURE: 97.8 F | DIASTOLIC BLOOD PRESSURE: 62 MMHG

## 2023-02-17 VITALS — HEART RATE: 103 BPM | SYSTOLIC BLOOD PRESSURE: 116 MMHG | DIASTOLIC BLOOD PRESSURE: 64 MMHG | TEMPERATURE: 97.7 F

## 2023-02-17 VITALS — SYSTOLIC BLOOD PRESSURE: 125 MMHG | HEART RATE: 75 BPM | DIASTOLIC BLOOD PRESSURE: 60 MMHG | TEMPERATURE: 98.6 F

## 2023-02-17 VITALS — TEMPERATURE: 98.2 F | HEART RATE: 82 BPM | DIASTOLIC BLOOD PRESSURE: 59 MMHG | SYSTOLIC BLOOD PRESSURE: 121 MMHG

## 2023-02-17 VITALS — TEMPERATURE: 97.7 F | HEART RATE: 82 BPM | SYSTOLIC BLOOD PRESSURE: 136 MMHG | DIASTOLIC BLOOD PRESSURE: 66 MMHG

## 2023-02-17 VITALS — SYSTOLIC BLOOD PRESSURE: 127 MMHG | TEMPERATURE: 98.1 F | HEART RATE: 94 BPM | DIASTOLIC BLOOD PRESSURE: 62 MMHG

## 2023-02-17 LAB
ANION GAP SERPL CALC-SCNC: 11 MMOL/L (ref 7–16)
ANISOCYTOSIS BLD QL: (no result)
BUN SERPL-MCNC: 5 MG/DL (ref 8–26)
CALCIUM SERPL-MCNC: 8.4 MG/DL (ref 8.4–10.2)
CHLORIDE SERPL-SCNC: 105 MMOL/L (ref 98–107)
CO2 SERPL-SCNC: 23 MMOL/L (ref 23–31)
CREAT SERPL-SCNC: 0.41 MG/DL (ref 0.72–1.25)
ERYTHROCYTE [DISTWIDTH] IN BLOOD BY AUTOMATED COUNT: 17.2 % (ref 11.5–14.5)
GLUCOSE SERPL-MCNC: 109 MG/DL (ref 70–99)
HCT VFR BLD AUTO: 36.9 % (ref 42–52)
HGB BLD-MCNC: 11.8 G/DL (ref 13.5–18)
LYMPHOCYTES NFR BLD MANUAL: 27 % (ref 20–51)
MCH RBC QN AUTO: 27 PG (ref 27–31)
MCHC RBC AUTO-ENTMCNC: 32 G/DL (ref 33–37)
MCV RBC AUTO: 84 FL (ref 80–100)
MONOCYTES NFR BLD: 5 % (ref 1.7–9.3)
NEUTS BAND NFR BLD: 5 % (ref 0–10)
NEUTS SEG NFR BLD MANUAL: 63 % (ref 42–75.2)
NRBC BLD AUTO-RTO: 1 % (ref 0–6)
PLATELET # BLD AUTO: 234 K/MM3 (ref 130–400)
PLATELET BLD QL SMEAR: NORMAL
PMV BLD AUTO: 8.8 FL (ref 7.4–10.4)
POLYCHROMASIA BLD QL SMEAR: (no result)
POTASSIUM SERPL-SCNC: 3.1 MMOL/L (ref 3.5–4.5)
RBC # BLD AUTO: 4.38 M/MM3 (ref 4.2–5.6)
SODIUM SERPL-SCNC: 139 MMOL/L (ref 136–145)

## 2023-02-17 NOTE — NUR
Patient scheduled telehealth visit with Dr. Turner, Infectious Disease.  Pt
consents to visit.  Equipment set up, audio and video connections established.
 Visit conducted by MD.  No technical concerns or issues.
RN present in the room.

## 2023-02-17 NOTE — NUR
THE PATIENT HAD AN UNEVENTFUL NIGHT PAIN MEDICATION GIVEN WHEN ASKED. THE
PATIENT SLEPT VERY WELL. NO OTHER CONCERNS.

## 2023-02-17 NOTE — NUR
The hospitalist notified SW that they are awaiting ID's recs, but the
patient is likely going to need outpatient IV antibiotics upon discharge. BALTAZAR
notified Maria A at [facility]. Maria A states they can assist with this, but
would prefer a discharge on Monday, not over the weekend.
 
SW contacted the patient's daughter, Aubree, to update on the IV antibiotics.
Aubree states that she does feel comfortable administering the IV
antibiotics and is agreeable to using [facility]. She would prefer to also
have home health come out for the first infusion at home. Aubree also
inquired about TPN and if this is something to consider for the patient. BALTAZAR
updated the hospitalist. Awaiting antibiotic rec.

## 2023-02-17 NOTE — NUR
During morning shift change, patient was resting quietly in bed. Bilateral
heel boots were on. Patient has a indwelling tinajero catheter in place with no
sign of irritation. Patient also has a PICC line to the right upper arm that
is free of redness and irritation. Patient has a dressing to the coccyx area
that is clean, dry, and intact. Patient was given the call light and bed was
placed in the lowest place. Primary nurse notified of morning assessment.

## 2023-02-17 NOTE — NUR
Dressing to coccyx changed.  Wound edge clean, wound bed pink, no drainage.
Removed packing.  Small amount dark red drainage to packing.  Replaced packing
and applied allevyn dressings.

## 2023-02-18 VITALS — TEMPERATURE: 97.9 F | HEART RATE: 95 BPM | SYSTOLIC BLOOD PRESSURE: 109 MMHG | DIASTOLIC BLOOD PRESSURE: 59 MMHG

## 2023-02-18 VITALS — HEART RATE: 79 BPM | TEMPERATURE: 97.7 F | SYSTOLIC BLOOD PRESSURE: 116 MMHG | DIASTOLIC BLOOD PRESSURE: 60 MMHG

## 2023-02-18 VITALS — TEMPERATURE: 97.7 F | SYSTOLIC BLOOD PRESSURE: 109 MMHG | DIASTOLIC BLOOD PRESSURE: 56 MMHG | HEART RATE: 77 BPM

## 2023-02-18 VITALS — HEART RATE: 93 BPM | DIASTOLIC BLOOD PRESSURE: 66 MMHG | TEMPERATURE: 98.3 F | SYSTOLIC BLOOD PRESSURE: 116 MMHG

## 2023-02-18 VITALS — TEMPERATURE: 97.8 F | HEART RATE: 87 BPM | SYSTOLIC BLOOD PRESSURE: 129 MMHG | DIASTOLIC BLOOD PRESSURE: 64 MMHG

## 2023-02-18 VITALS — HEART RATE: 104 BPM | TEMPERATURE: 98 F | SYSTOLIC BLOOD PRESSURE: 101 MMHG | DIASTOLIC BLOOD PRESSURE: 46 MMHG

## 2023-02-18 VITALS — HEART RATE: 84 BPM | SYSTOLIC BLOOD PRESSURE: 121 MMHG | TEMPERATURE: 97.6 F | DIASTOLIC BLOOD PRESSURE: 58 MMHG

## 2023-02-18 LAB
ANION GAP SERPL CALC-SCNC: 12 MMOL/L (ref 7–16)
BUN SERPL-MCNC: 5 MG/DL (ref 8–26)
CALCIUM SERPL-MCNC: 8.2 MG/DL (ref 8.4–10.2)
CHLORIDE SERPL-SCNC: 105 MMOL/L (ref 98–107)
CO2 SERPL-SCNC: 20 MMOL/L (ref 23–31)
CREAT SERPL-SCNC: 0.42 MG/DL (ref 0.72–1.25)
GLUCOSE SERPL-MCNC: 88 MG/DL (ref 70–99)
POTASSIUM SERPL-SCNC: 3 MMOL/L (ref 3.5–4.5)
SODIUM SERPL-SCNC: 137 MMOL/L (ref 136–145)

## 2023-02-18 NOTE — NUR
SHIFT ASSESSMENT COMPLETED AND MORNING MEDICATIONS ADMINISTERED PER ORDER.
PATIENT IS ALERT AND ORIENTED. INITALLY DENIED PAIN, BUT C/O PAIN LATER ON,
TREATED WITH PRN MEDICATION PER ORDER. WOUND TO COCCYX AND LEFT HEEL. PRIMA
BOOTS IN PLACE. ON SPECIALIZED MATTRESS, TURNED Q2. TREVINO IN PLACE. COLOSTOMY
IN PLACE, NO SIGNS OF COMPLICATIONS NOTED. DENIES NEEDS AT THIS TIME. CALL
LIGTH WITHIN REACH.

## 2023-02-18 NOTE — NUR
THIS RN NOTIFIED THAT PATIENT WAS COMPLAINING OF PAIN TO ANOTHER STAFF MEMBER.
PATIENT STATES HE IS HAVING CHEST, ABD, AND BACK PAIN. PATIENT GIVEN PRN PAIN
MEDICATION PER ORDER. CNA IN ROOM TO OBTAIN VITAL SIGNS. MADISON HEAD UPDATED,
WHO STATES SHE WILL BE PUTTING IN ORDERS. RESP THERAPIST UPDATED REGARDING
NEED FOR EKG. NO SOB NOTED, PATIENT STATES HIS PAIN IS MOSTLY IN HIS ABD.

## 2023-02-19 VITALS — TEMPERATURE: 97.5 F | HEART RATE: 105 BPM | DIASTOLIC BLOOD PRESSURE: 51 MMHG | SYSTOLIC BLOOD PRESSURE: 105 MMHG

## 2023-02-19 VITALS — HEART RATE: 105 BPM | TEMPERATURE: 98.9 F | SYSTOLIC BLOOD PRESSURE: 111 MMHG | DIASTOLIC BLOOD PRESSURE: 62 MMHG

## 2023-02-19 VITALS — SYSTOLIC BLOOD PRESSURE: 113 MMHG | TEMPERATURE: 98 F | DIASTOLIC BLOOD PRESSURE: 67 MMHG | HEART RATE: 107 BPM

## 2023-02-19 VITALS — SYSTOLIC BLOOD PRESSURE: 109 MMHG | HEART RATE: 114 BPM | TEMPERATURE: 98.2 F | DIASTOLIC BLOOD PRESSURE: 60 MMHG

## 2023-02-19 VITALS — SYSTOLIC BLOOD PRESSURE: 121 MMHG | DIASTOLIC BLOOD PRESSURE: 60 MMHG | TEMPERATURE: 97.5 F | HEART RATE: 105 BPM

## 2023-02-19 LAB
ANION GAP SERPL CALC-SCNC: 11 MMOL/L (ref 7–16)
ANISOCYTOSIS BLD QL: (no result)
BUN SERPL-MCNC: 7 MG/DL (ref 8–26)
CALCIUM SERPL-MCNC: 8.1 MG/DL (ref 8.4–10.2)
CHLORIDE SERPL-SCNC: 106 MMOL/L (ref 98–107)
CO2 SERPL-SCNC: 20 MMOL/L (ref 23–31)
CREAT SERPL-SCNC: 0.4 MG/DL (ref 0.72–1.25)
EOSINOPHIL NFR BLD: 2 % (ref 0–4)
ERYTHROCYTE [DISTWIDTH] IN BLOOD BY AUTOMATED COUNT: 18.3 % (ref 11.5–14.5)
GLUCOSE SERPL-MCNC: 84 MG/DL (ref 70–99)
HCT VFR BLD AUTO: 36.4 % (ref 42–52)
HGB BLD-MCNC: 11.8 G/DL (ref 13.5–18)
LYMPHOCYTES NFR BLD MANUAL: 30 % (ref 20–51)
MCH RBC QN AUTO: 27 PG (ref 27–31)
MCHC RBC AUTO-ENTMCNC: 32 G/DL (ref 33–37)
MCV RBC AUTO: 83 FL (ref 80–100)
MONOCYTES NFR BLD: 9 % (ref 1.7–9.3)
NEUTS BAND NFR BLD: 3 % (ref 0–10)
NEUTS SEG NFR BLD MANUAL: 56 % (ref 42–75.2)
PLATELET # BLD AUTO: 255 K/MM3 (ref 130–400)
PLATELET BLD QL SMEAR: NORMAL
PMV BLD AUTO: 8.8 FL (ref 7.4–10.4)
POTASSIUM SERPL-SCNC: 3.4 MMOL/L (ref 3.5–4.5)
RBC # BLD AUTO: 4.41 M/MM3 (ref 4.2–5.6)
SODIUM SERPL-SCNC: 137 MMOL/L (ref 136–145)

## 2023-02-19 NOTE — NUR
SHIFT ASSESSMENT COMPLETED AND MORNING MEDICATIONS ADMINISTERED PER ORDER.
PATIENT IS ALERT AND ORIENTED. LUNGS CTA. C/O PAIN, TREATED WITH PRN PAIN
MEDICATION. PATIENT SEEMS TO HAVE INCREASING PAIN OVER THE LAST TWO DAYS, DR. ARGUELLO NOTIFIED. PATIENT REPOSITIONED FREQUENTLY. DENIES FURTHER NEEDS. ON IV
ZOSYN AND POTASSIUM, TOLERATING WELL. FENT PATCH IN PLACE TO LEFT CHEST.

## 2023-02-19 NOTE — NUR
PATIENT C/O PAIN TO LPN DURING ROUNDING. THIS RN WENT IN TO ROOM WITH PRN PAIN
MEDICATION AND OFFERED PAIN MEDICATION TO PATIENT, PATIENT STATES "NO, NO PAIN
MEDICATION, JUST WANT TO SLEEP." PATIENT THEN CLOSED HIS EYES AND WENT TO
SLEEP. MEDICATION WASTED.

## 2023-02-19 NOTE — NUR
rounds:  visit attempted. Patient was sleeping.There was a
food tray in the room that appeared untouched.  prayed for peaceful
sleep for the Patient.

## 2023-02-19 NOTE — NUR
UPDATE PROVIDED TO PATIENT'S DAUGHTER. DAUGHTER STATES SHE IS CONCERNED THAT
PATIENT HAS POOR PO INTAKE AND SHE WOULD LIKE TO DISCUSS PLACING PATIENT ON
TPN. DAUGHTER INFORMED THAT THIS WOULD BE A CONVERSATION TO HAVE WITH THE
HOSPITALIST. DR. ARGUELLO UPDATED, PATIENT'S DAUGHTER TO COME IN TOMORROW
AFTERNOON TO MEET WITH DR. ARGUELLO AND DISCUSS GOALS OF CARE.

## 2023-02-20 VITALS — TEMPERATURE: 98.3 F | SYSTOLIC BLOOD PRESSURE: 103 MMHG | HEART RATE: 111 BPM | DIASTOLIC BLOOD PRESSURE: 54 MMHG

## 2023-02-20 VITALS — DIASTOLIC BLOOD PRESSURE: 49 MMHG | SYSTOLIC BLOOD PRESSURE: 109 MMHG | TEMPERATURE: 97.5 F | HEART RATE: 115 BPM

## 2023-02-20 VITALS — TEMPERATURE: 97.9 F | DIASTOLIC BLOOD PRESSURE: 56 MMHG | SYSTOLIC BLOOD PRESSURE: 114 MMHG | HEART RATE: 112 BPM

## 2023-02-20 VITALS — HEART RATE: 112 BPM | TEMPERATURE: 97.8 F | DIASTOLIC BLOOD PRESSURE: 55 MMHG | SYSTOLIC BLOOD PRESSURE: 107 MMHG

## 2023-02-20 VITALS — SYSTOLIC BLOOD PRESSURE: 112 MMHG | DIASTOLIC BLOOD PRESSURE: 62 MMHG | TEMPERATURE: 98.8 F | HEART RATE: 106 BPM

## 2023-02-20 VITALS — SYSTOLIC BLOOD PRESSURE: 124 MMHG | DIASTOLIC BLOOD PRESSURE: 55 MMHG | TEMPERATURE: 97.8 F | HEART RATE: 103 BPM

## 2023-02-20 VITALS — DIASTOLIC BLOOD PRESSURE: 58 MMHG | HEART RATE: 110 BPM | SYSTOLIC BLOOD PRESSURE: 106 MMHG | TEMPERATURE: 98.6 F

## 2023-02-20 LAB
ANION GAP SERPL CALC-SCNC: 17 MMOL/L (ref 7–16)
ANISOCYTOSIS BLD QL: (no result)
BUN SERPL-MCNC: 6 MG/DL (ref 8–26)
CALCIUM SERPL-MCNC: 8.1 MG/DL (ref 8.4–10.2)
CHLORIDE SERPL-SCNC: 104 MMOL/L (ref 98–107)
CO2 SERPL-SCNC: 17 MMOL/L (ref 23–31)
CREAT SERPL-SCNC: 0.42 MG/DL (ref 0.72–1.25)
EOSINOPHIL NFR BLD: 1 % (ref 0–4)
ERYTHROCYTE [DISTWIDTH] IN BLOOD BY AUTOMATED COUNT: 17.9 % (ref 11.5–14.5)
GLUCOSE SERPL-MCNC: 75 MG/DL (ref 70–99)
HCT VFR BLD AUTO: 37.4 % (ref 42–52)
HGB BLD-MCNC: 12 G/DL (ref 13.5–18)
HYPOCHROMIA BLD QL SMEAR: (no result)
LYMPHOCYTES NFR BLD MANUAL: 17 % (ref 20–51)
MCH RBC QN AUTO: 27 PG (ref 27–31)
MCHC RBC AUTO-ENTMCNC: 32 G/DL (ref 33–37)
MCV RBC AUTO: 83 FL (ref 80–100)
METAMYELOCYTES NFR BLD MANUAL: 1 % (ref 0–0)
MONOCYTES NFR BLD: 11 % (ref 1.7–9.3)
MYELOCYTES NFR BLD MANUAL: 1 % (ref 0–0)
NEUTS BAND NFR BLD: 2 % (ref 0–10)
NEUTS SEG NFR BLD MANUAL: 67 % (ref 42–75.2)
PLATELET # BLD AUTO: 288 K/MM3 (ref 130–400)
PLATELET BLD QL SMEAR: NORMAL
PMV BLD AUTO: 8.8 FL (ref 7.4–10.4)
POTASSIUM SERPL-SCNC: 3.1 MMOL/L (ref 3.5–4.5)
RBC # BLD AUTO: 4.51 M/MM3 (ref 4.2–5.6)
SODIUM SERPL-SCNC: 138 MMOL/L (ref 136–145)

## 2023-02-20 NOTE — NUR
BALTAZAR informed by physician that ID is recommending Zosyn 4.5gm for 5 weeks.
Patients clinical referral faxed to Renetta at [facility]. Phone call made to Renetta,
who states that they do not contract with the patients [employer], however she
can run the patients MCR to see if he has prescription coverage.
BALTAZAR also obtained prescription for the patient to receive an air mattress.

## 2023-02-20 NOTE — NUR
PATIENTS DRESSING TO LEFT HEAL, LEFT BUTTUCK AND SACRUM CHANGED BY THIS RN.
USED DRESSING ORDERS PLACED IN WOUND CARE CONSULT NOTED.

## 2023-02-20 NOTE — NUR
PATIENT RESTING IN BED, ASLEEP. TREVINO PATENT AND DRAINING. MATTRESS TURNING
PATIENT. PICC LINE PATENT. CALL LIGHT WITH IN REACH. BED ALARM ON.

## 2023-02-20 NOTE — NUR
Patient scheduled telehealth visit with Dr. Turner, Infectious Disease.  Pt
consents to visit. Pts daughter present in room.
Equipment set up, audio and video connections established.
 Visit conducted by MD.  No technical concerns or issues.

## 2023-02-21 VITALS — DIASTOLIC BLOOD PRESSURE: 60 MMHG | HEART RATE: 110 BPM | SYSTOLIC BLOOD PRESSURE: 104 MMHG | TEMPERATURE: 98.4 F

## 2023-02-21 VITALS — DIASTOLIC BLOOD PRESSURE: 72 MMHG | SYSTOLIC BLOOD PRESSURE: 110 MMHG | TEMPERATURE: 97.6 F | HEART RATE: 106 BPM

## 2023-02-21 VITALS — SYSTOLIC BLOOD PRESSURE: 102 MMHG | HEART RATE: 109 BPM | TEMPERATURE: 98.9 F | DIASTOLIC BLOOD PRESSURE: 57 MMHG

## 2023-02-21 VITALS — SYSTOLIC BLOOD PRESSURE: 108 MMHG | DIASTOLIC BLOOD PRESSURE: 55 MMHG | TEMPERATURE: 98.2 F | HEART RATE: 115 BPM

## 2023-02-21 VITALS — HEART RATE: 110 BPM | SYSTOLIC BLOOD PRESSURE: 110 MMHG | DIASTOLIC BLOOD PRESSURE: 60 MMHG

## 2023-02-21 VITALS — SYSTOLIC BLOOD PRESSURE: 94 MMHG | DIASTOLIC BLOOD PRESSURE: 48 MMHG | TEMPERATURE: 97.7 F | HEART RATE: 113 BPM

## 2023-02-21 VITALS — HEART RATE: 102 BPM | DIASTOLIC BLOOD PRESSURE: 47 MMHG | SYSTOLIC BLOOD PRESSURE: 101 MMHG | TEMPERATURE: 98.1 F

## 2023-02-21 LAB
ANION GAP SERPL CALC-SCNC: 13 MMOL/L (ref 7–16)
ANISOCYTOSIS BLD QL: (no result)
BUN SERPL-MCNC: 7 MG/DL (ref 8–26)
CALCIUM SERPL-MCNC: 8.4 MG/DL (ref 8.4–10.2)
CHLORIDE SERPL-SCNC: 103 MMOL/L (ref 98–107)
CO2 SERPL-SCNC: 20 MMOL/L (ref 23–31)
CREAT SERPL-SCNC: 0.45 MG/DL (ref 0.72–1.25)
EOSINOPHIL NFR BLD: 2 % (ref 0–4)
ERYTHROCYTE [DISTWIDTH] IN BLOOD BY AUTOMATED COUNT: 18.1 % (ref 11.5–14.5)
GLUCOSE SERPL-MCNC: 129 MG/DL (ref 70–99)
HCT VFR BLD AUTO: 40 % (ref 42–52)
HGB BLD-MCNC: 12.7 G/DL (ref 13.5–18)
HYPOCHROMIA BLD QL SMEAR: (no result)
LYMPHOCYTES NFR BLD MANUAL: 30 % (ref 20–51)
MCH RBC QN AUTO: 27 PG (ref 27–31)
MCHC RBC AUTO-ENTMCNC: 32 G/DL (ref 33–37)
MCV RBC AUTO: 84 FL (ref 80–100)
MICROCYTES BLD QL SMEAR: (no result)
MONOCYTES NFR BLD: 7 % (ref 1.7–9.3)
NEUTS BAND NFR BLD: 5 % (ref 0–10)
NEUTS SEG NFR BLD MANUAL: 56 % (ref 42–75.2)
PLATELET # BLD AUTO: 271 K/MM3 (ref 130–400)
PLATELET BLD QL SMEAR: NORMAL
PMV BLD AUTO: 9.2 FL (ref 7.4–10.4)
POTASSIUM SERPL-SCNC: 3.5 MMOL/L (ref 3.5–4.5)
RBC # BLD AUTO: 4.75 M/MM3 (ref 4.2–5.6)
SODIUM SERPL-SCNC: 136 MMOL/L (ref 136–145)

## 2023-02-21 NOTE — NUR
Patient is with family at the bedside, receiving pain medications as allowed.
Dressing changed on his pressure ulcers. Report given to night RN.

## 2023-02-21 NOTE — NUR
Patient is lying in bed, alert and oriented x 4, tachycardic. Telemery in
place, at . Assessment completed. Pain medication provided. No other needs
at this time. Call light within reacy.

## 2023-02-21 NOTE — NUR
Patient scheduled telehealth visit with Dr. Turner, Infectious Disease.  Pt
consents to visit.  Equipment set up, audio and video connections established.
 Visit conducted by MD.  No technical concerns or issues.

## 2023-02-21 NOTE — NUR
spoke with Hospitalist who advised if IV antibiotics be
established for patient, he can be discharged as early as today. BALTAZAR contacted
Doyle at [employer] who advised patient's insurance will cover home
antibiotics with a copay of $1.45 per week.
 
BALTAZAR then followed up with Neha at [facility] who expressed
concerns from her team about the IV antibiotics being managed at home as it's
three times a day and patient's daughter works outside of the home.
 
BALTAZAR consulted with Hospitalist who advised a referral to [facility] may be a better
option for patient.
 
BALTAZAR met with patient's daughter, Ivy who was agreeable to referral to
[facility] after being updated that [facility] would not be able to follow at this
time due to concerns about IV antibiotics being managed in the home.
 
BALTAZAR contacted David at [facility] and faxed referral. David to review with his team
and notify BALTAZAR tomorrow morning of a decision. BATLAZAR updated Ivy.

## 2023-02-22 VITALS — HEART RATE: 118 BPM | SYSTOLIC BLOOD PRESSURE: 116 MMHG | TEMPERATURE: 98.2 F | DIASTOLIC BLOOD PRESSURE: 58 MMHG

## 2023-02-22 VITALS — HEART RATE: 102 BPM | SYSTOLIC BLOOD PRESSURE: 105 MMHG | DIASTOLIC BLOOD PRESSURE: 56 MMHG | TEMPERATURE: 99 F

## 2023-02-22 VITALS — SYSTOLIC BLOOD PRESSURE: 118 MMHG | TEMPERATURE: 99.1 F | DIASTOLIC BLOOD PRESSURE: 57 MMHG | HEART RATE: 119 BPM

## 2023-02-22 VITALS — DIASTOLIC BLOOD PRESSURE: 61 MMHG | SYSTOLIC BLOOD PRESSURE: 123 MMHG | TEMPERATURE: 97.6 F | HEART RATE: 109 BPM

## 2023-02-22 VITALS — SYSTOLIC BLOOD PRESSURE: 105 MMHG | DIASTOLIC BLOOD PRESSURE: 52 MMHG | HEART RATE: 106 BPM | TEMPERATURE: 97.9 F

## 2023-02-22 VITALS — DIASTOLIC BLOOD PRESSURE: 45 MMHG | HEART RATE: 115 BPM | TEMPERATURE: 98.1 F | SYSTOLIC BLOOD PRESSURE: 108 MMHG

## 2023-02-22 LAB
ANION GAP SERPL CALC-SCNC: 11 MMOL/L (ref 7–16)
BASOPHILS # BLD: 0 K/MM3 (ref 0–0.2)
BASOPHILS NFR BLD AUTO: 0.3 % (ref 0–2)
BUN SERPL-MCNC: 6 MG/DL (ref 8–26)
CALCIUM SERPL-MCNC: 8.2 MG/DL (ref 8.4–10.2)
CHLORIDE SERPL-SCNC: 105 MMOL/L (ref 98–107)
CO2 SERPL-SCNC: 22 MMOL/L (ref 23–31)
CREAT SERPL-SCNC: 0.42 MG/DL (ref 0.72–1.25)
EOSINOPHIL # BLD: 0.1 K/MM3 (ref 0–0.7)
EOSINOPHIL NFR BLD: 1.5 % (ref 0–4)
ERYTHROCYTE [DISTWIDTH] IN BLOOD BY AUTOMATED COUNT: 18 % (ref 11.5–14.5)
GLUCOSE SERPL-MCNC: 91 MG/DL (ref 70–99)
GRANULOCYTES # BLD AUTO: 60.6 % (ref 42.2–75.2)
HCT VFR BLD AUTO: 34.9 % (ref 42–52)
HGB BLD-MCNC: 10.9 G/DL (ref 13.5–18)
LYMPHOCYTES # BLD: 2.4 K/MM3 (ref 1.2–3.4)
LYMPHOCYTES NFR BLD: 25.3 % (ref 20–51)
MCH RBC QN AUTO: 27 PG (ref 27–31)
MCHC RBC AUTO-ENTMCNC: 31 G/DL (ref 33–37)
MCV RBC AUTO: 85 FL (ref 80–100)
MONOCYTES # BLD: 1 K/MM3 (ref 0.1–0.6)
MONOCYTES NFR BLD AUTO: 11 % (ref 1.7–9.3)
NEUTROPHILS # BLD: 5.7 K/MM3 (ref 1.4–6.5)
PLATELET # BLD AUTO: 308 K/MM3 (ref 130–400)
PMV BLD AUTO: 9.2 FL (ref 7.4–10.4)
POTASSIUM SERPL-SCNC: 3.6 MMOL/L (ref 3.5–4.5)
RBC # BLD AUTO: 4.12 M/MM3 (ref 4.2–5.6)
SODIUM SERPL-SCNC: 138 MMOL/L (ref 136–145)

## 2023-02-22 NOTE — NUR
PATIENT'S FAMILY AT BEDSIDE WITH GROUND BEEF AND MASHED POTATOES FROM HOME.
THIS RN EDUCATED FAMILY THAT GROUND BEEF IS NOT ON PATIENT'S MINCED AND MOIST
DIET AND THAT GIVING HIM THIS FOOD COULD CAUSE ASPIRATION, FAMILY STATES
PATIENT WILL BE FINE TO CONSUME THIS FOOD AND THAT THEY WILL LET THIS NURSE
KNOW IF THEY NOTICE PATIENT COUGHING. DR. ARGUELLO UPDATED. PATIENT ALSO WITH HR
IN LOW 120S, PRN PAIN MEDICATION GIVEN, DR. ARGUELLO AWARE OF INCREASED HR.

## 2023-02-22 NOTE — NUR
spoke with David at [facility] who advised they can clinically
accept patient, however there are vented patient's that are higher priority,
meaning patient may not have a bed until this weekend or next week. SW
contacted patient's daughter, Ivy to provide update. BALTAZAR also updated
Hospitalist.

## 2023-02-22 NOTE — NUR
PATIENT ON POTASSIUM REPLACEMENT PROTOCOL. POTASSIUM THIS MORNING NOTED TO BE
3.6. PATIENT HAS PO POTASSIUM ALREADY ORDERED, PER MADISON YO, PATIENT DOES
NOT NEED ADDITIONAL POTASSIUM REPLACEMENT TODAY.

## 2023-02-22 NOTE — NUR
SHIFT ASSESSMENT COMPLETED AND MORNING MEDICATIONS ADMINISTERED PER ORDER.
PATIENT IS ALERT AND ORIENTED. HR ELEVATED -120, PATIENT STATES HE IS
HAVING PAIN. PAIN MEDICATION ADMINISTERED PER ORDER, PATIENT'S HEART RATE HAS
NOW DECREASED  AND PATIENT IS SLEEPING. DENIES ANY FURTHER NEEDS AT THIS
TIME. PO INTAKE ENCOURAGED. CALL LIGHT WITHIN REACH.

## 2023-02-22 NOTE — NUR
THIS RN CALLED INTO ROOM BY FAMILY. PER FAMILY, THEY WOULD LIKE TO DISCUSS
PAIN CONTROL WITH DR. ARGUELLO TOMORROW. FAMILY STATES THEY BELIEVE PATIENT WOULD
BENEFIT FROM DIFFERENT PAIN MANAGEMENT PROCEDURES AND THEY WOULD LIKE
INFORMATION ON WHERE TO HAVE THESE PROCEDURES DONE. FAMILY TO COME BACK
TOMORROW TO SPEAK WITH DR. ARGUELLO. PATIENT SLEEPING AT THIS TIME, DOES NOT
APPEAR TO BE IN PAIN.

## 2023-02-22 NOTE — NUR
PATIENT'S INITAL MEWS SCORE THIS MORNING NOTED TO BE 3, FOLLOWING PAIN
MEDICATION ADMINISTRATION, HR DECREASED AND MEWS SCORE IS NOW A 2.

## 2023-02-22 NOTE — NUR
DAUGHTER CALLED REQUESTING TO BE ABLE TO ADMINISTER A MISTELTOE INJECTION FOR
THE PATIENT, PER DAUGHTER, PATIENT'S HOLISTIC ONCOLOGIST SUGGESTED THIS
TREATMENT. DR. ARGUELLO UPDATED WHO STATES SHE IS NOT COMFORTABLE WITH PATIENT
RECEIVING THIS DURING HOSPITAL STAY, DAUGHTER UPDATED WHO IS AGREEABLE.

## 2023-02-22 NOTE — NUR
PATIENT SLEEPING AT THIS TIME. INITIAL MEWS WAS A 3, HR NOW DECREASED ,
MEWS SCORE IS NOW A 2. REPORT GIVEN TO HS RN.

## 2023-02-23 VITALS — TEMPERATURE: 98 F | DIASTOLIC BLOOD PRESSURE: 58 MMHG | SYSTOLIC BLOOD PRESSURE: 105 MMHG | HEART RATE: 70 BPM

## 2023-02-23 VITALS — SYSTOLIC BLOOD PRESSURE: 106 MMHG | HEART RATE: 107 BPM | TEMPERATURE: 97.5 F | DIASTOLIC BLOOD PRESSURE: 59 MMHG

## 2023-02-23 VITALS — DIASTOLIC BLOOD PRESSURE: 61 MMHG | SYSTOLIC BLOOD PRESSURE: 114 MMHG | HEART RATE: 109 BPM | TEMPERATURE: 97.6 F

## 2023-02-23 VITALS — DIASTOLIC BLOOD PRESSURE: 55 MMHG | HEART RATE: 101 BPM | SYSTOLIC BLOOD PRESSURE: 108 MMHG | TEMPERATURE: 98 F

## 2023-02-23 VITALS — HEART RATE: 117 BPM | SYSTOLIC BLOOD PRESSURE: 91 MMHG | TEMPERATURE: 97.8 F | DIASTOLIC BLOOD PRESSURE: 42 MMHG

## 2023-02-23 VITALS — DIASTOLIC BLOOD PRESSURE: 53 MMHG | SYSTOLIC BLOOD PRESSURE: 101 MMHG | HEART RATE: 106 BPM | TEMPERATURE: 97.6 F

## 2023-02-23 LAB
ANION GAP SERPL CALC-SCNC: 9 MMOL/L (ref 7–16)
BASOPHILS # BLD: 0 K/MM3 (ref 0–0.2)
BASOPHILS NFR BLD AUTO: 0.4 % (ref 0–2)
BUN SERPL-MCNC: 6 MG/DL (ref 8–26)
CALCIUM SERPL-MCNC: 8.4 MG/DL (ref 8.4–10.2)
CHLORIDE SERPL-SCNC: 105 MMOL/L (ref 98–107)
CO2 SERPL-SCNC: 22 MMOL/L (ref 23–31)
CREAT SERPL-SCNC: 0.44 MG/DL (ref 0.72–1.25)
EOSINOPHIL # BLD: 0.1 K/MM3 (ref 0–0.7)
EOSINOPHIL NFR BLD: 1.5 % (ref 0–4)
ERYTHROCYTE [DISTWIDTH] IN BLOOD BY AUTOMATED COUNT: 18.1 % (ref 11.5–14.5)
GLUCOSE SERPL-MCNC: 94 MG/DL (ref 70–99)
GRANULOCYTES # BLD AUTO: 58.4 % (ref 42.2–75.2)
HCT VFR BLD AUTO: 35.4 % (ref 42–52)
HGB BLD-MCNC: 11.4 G/DL (ref 13.5–18)
LYMPHOCYTES # BLD: 2.8 K/MM3 (ref 1.2–3.4)
LYMPHOCYTES NFR BLD: 29.6 % (ref 20–51)
MCH RBC QN AUTO: 27 PG (ref 27–31)
MCHC RBC AUTO-ENTMCNC: 32 G/DL (ref 33–37)
MCV RBC AUTO: 83 FL (ref 80–100)
MONOCYTES # BLD: 0.8 K/MM3 (ref 0.1–0.6)
MONOCYTES NFR BLD AUTO: 9 % (ref 1.7–9.3)
NEUTROPHILS # BLD: 5.5 K/MM3 (ref 1.4–6.5)
PLATELET # BLD AUTO: 309 K/MM3 (ref 130–400)
PMV BLD AUTO: 9.1 FL (ref 7.4–10.4)
POTASSIUM SERPL-SCNC: 3.6 MMOL/L (ref 3.5–4.5)
RBC # BLD AUTO: 4.26 M/MM3 (ref 4.2–5.6)
SODIUM SERPL-SCNC: 136 MMOL/L (ref 136–145)

## 2023-02-23 NOTE — NUR
PATIENT IN BED AT THIS TIME. FAMILY VISITED TODAY AND SPOKE WITH DR. ARGUELLO.
DRESSINGS TO COCCYX, LEFT BUTTOCK, AND HEEL CHANGED BY WOUND CARE NURSE,
CARLOS MANUEL, THIS MORNING. PATIENT DENIES NEEDS AT THIS TIME. CALL LIGHT WITHIN
REACH.

## 2023-02-23 NOTE — NUR
Patient scheduled telehealth visit with Dr. Turner, Infectious Disease.  Pt
consents to visit. PTs daughter present in the room.
Equipment set up, audio and video connections established.
 Visit conducted by MD.  No technical concerns or issues.

## 2023-02-23 NOTE — NUR
SHIFT ASSESSMENT COMPLETED AND MORNING MEDICATIONS ADMINISTERED PER ORDER.
PATIENT IS ALERT AND ORIENTED. C/O PAIN, PRN PERCOCET GIVEN PER ORDER. ON
POTASSIUM REPLACEMENT, POTASSIUM TABS ALREADY ORDERED, PER MADISON YO, PATIENT
DOES NOT NEED FURTHER REPLACEMENT. PATIENT REPOSITIONED. DENIES FURTHER NEEDS.
CALL LIGHT WITHIN REACH.

## 2023-02-24 VITALS — SYSTOLIC BLOOD PRESSURE: 119 MMHG | HEART RATE: 96 BPM | DIASTOLIC BLOOD PRESSURE: 58 MMHG | TEMPERATURE: 97.8 F

## 2023-02-24 VITALS — DIASTOLIC BLOOD PRESSURE: 52 MMHG | HEART RATE: 113 BPM | SYSTOLIC BLOOD PRESSURE: 109 MMHG | TEMPERATURE: 97.9 F

## 2023-02-24 VITALS — TEMPERATURE: 97.7 F | HEART RATE: 109 BPM | SYSTOLIC BLOOD PRESSURE: 111 MMHG | DIASTOLIC BLOOD PRESSURE: 53 MMHG

## 2023-02-24 VITALS — DIASTOLIC BLOOD PRESSURE: 53 MMHG | TEMPERATURE: 98.4 F | SYSTOLIC BLOOD PRESSURE: 98 MMHG | HEART RATE: 110 BPM

## 2023-02-24 VITALS — DIASTOLIC BLOOD PRESSURE: 54 MMHG | HEART RATE: 101 BPM | SYSTOLIC BLOOD PRESSURE: 114 MMHG | TEMPERATURE: 97.6 F

## 2023-02-24 VITALS — SYSTOLIC BLOOD PRESSURE: 93 MMHG | HEART RATE: 103 BPM | TEMPERATURE: 98.4 F | DIASTOLIC BLOOD PRESSURE: 54 MMHG

## 2023-02-24 LAB
ALBUMIN SERPL-MCNC: 2.1 GM/DL (ref 3.4–4.8)
ALP SERPL-CCNC: 201 U/L (ref 40–150)
ALT SERPL-CCNC: 17 U/L (ref 0–55)
ANION GAP SERPL CALC-SCNC: 10 MMOL/L (ref 7–16)
AST SERPL-CCNC: 41 U/L (ref 5–34)
BASOPHILS # BLD: 0.1 K/MM3 (ref 0–0.2)
BASOPHILS NFR BLD AUTO: 0.5 % (ref 0–2)
BILIRUB SERPL-MCNC: 0.6 MG/DL (ref 0.2–1.2)
BUN SERPL-MCNC: 8 MG/DL (ref 8–26)
CALCIUM SERPL-MCNC: 8.3 MG/DL (ref 8.4–10.2)
CHLORIDE SERPL-SCNC: 104 MMOL/L (ref 98–107)
CO2 SERPL-SCNC: 22 MMOL/L (ref 23–31)
CREAT SERPL-SCNC: 0.46 MG/DL (ref 0.72–1.25)
EOSINOPHIL # BLD: 0.1 K/MM3 (ref 0–0.7)
EOSINOPHIL NFR BLD: 1 % (ref 0–4)
ERYTHROCYTE [DISTWIDTH] IN BLOOD BY AUTOMATED COUNT: 18.3 % (ref 11.5–14.5)
GLUCOSE SERPL-MCNC: 93 MG/DL (ref 70–99)
GRANULOCYTES # BLD AUTO: 62.6 % (ref 42.2–75.2)
HCT VFR BLD AUTO: 36.6 % (ref 42–52)
HGB BLD-MCNC: 11.7 G/DL (ref 13.5–18)
LYMPHOCYTES # BLD: 2.9 K/MM3 (ref 1.2–3.4)
LYMPHOCYTES NFR BLD: 26.9 % (ref 20–51)
MCH RBC QN AUTO: 27 PG (ref 27–31)
MCHC RBC AUTO-ENTMCNC: 32 G/DL (ref 33–37)
MCV RBC AUTO: 84 FL (ref 80–100)
MONOCYTES # BLD: 0.9 K/MM3 (ref 0.1–0.6)
MONOCYTES NFR BLD AUTO: 8.2 % (ref 1.7–9.3)
NEUTROPHILS # BLD: 6.6 K/MM3 (ref 1.4–6.5)
PLATELET # BLD AUTO: 315 K/MM3 (ref 130–400)
PMV BLD AUTO: 8.9 FL (ref 7.4–10.4)
POTASSIUM SERPL-SCNC: 3.8 MMOL/L (ref 3.5–4.5)
PROT SERPL-MCNC: 6.8 GM/DL (ref 6.2–8.1)
RBC # BLD AUTO: 4.36 M/MM3 (ref 4.2–5.6)
SODIUM SERPL-SCNC: 136 MMOL/L (ref 136–145)

## 2023-02-24 NOTE — NUR
NURSING COMPLETED DAILY DRESSING CHANGED TO LEFT SACRUM AND COCCYX. CLEANSED
WITH NS AND APPLIED PETROLATUM GAUZE WITH ZORFLEX ON TOP. FINALLY ADDED A
MEPILEX DRESSING FOR PROTECTION. PATIENT DID NOT COMPLAIN OF ANY PAIN. WOUND
STILL APPEARS THE SAME. NO CHANGES.
 
CHANGED LEFT HEEL DRESSING. WRAPPED IN KERLIX.

## 2023-02-25 VITALS — SYSTOLIC BLOOD PRESSURE: 100 MMHG | DIASTOLIC BLOOD PRESSURE: 51 MMHG | HEART RATE: 109 BPM | TEMPERATURE: 98.2 F

## 2023-02-25 VITALS — DIASTOLIC BLOOD PRESSURE: 50 MMHG | SYSTOLIC BLOOD PRESSURE: 81 MMHG | TEMPERATURE: 98.7 F | HEART RATE: 103 BPM

## 2023-02-25 VITALS — SYSTOLIC BLOOD PRESSURE: 108 MMHG | HEART RATE: 100 BPM | TEMPERATURE: 98 F | DIASTOLIC BLOOD PRESSURE: 57 MMHG

## 2023-02-25 VITALS — HEART RATE: 96 BPM | DIASTOLIC BLOOD PRESSURE: 49 MMHG | SYSTOLIC BLOOD PRESSURE: 93 MMHG

## 2023-02-25 VITALS — TEMPERATURE: 98.7 F | SYSTOLIC BLOOD PRESSURE: 109 MMHG | DIASTOLIC BLOOD PRESSURE: 61 MMHG | HEART RATE: 104 BPM

## 2023-02-25 VITALS — HEART RATE: 107 BPM | DIASTOLIC BLOOD PRESSURE: 62 MMHG | TEMPERATURE: 98.9 F | SYSTOLIC BLOOD PRESSURE: 116 MMHG

## 2023-02-25 VITALS — DIASTOLIC BLOOD PRESSURE: 55 MMHG | SYSTOLIC BLOOD PRESSURE: 95 MMHG | TEMPERATURE: 98.5 F | HEART RATE: 106 BPM

## 2023-02-25 LAB
ANION GAP SERPL CALC-SCNC: 10 MMOL/L (ref 7–16)
BASOPHILS # BLD: 0 K/MM3 (ref 0–0.2)
BASOPHILS NFR BLD AUTO: 0.4 % (ref 0–2)
BUN SERPL-MCNC: 7 MG/DL (ref 8–26)
CALCIUM SERPL-MCNC: 8.6 MG/DL (ref 8.4–10.2)
CHLORIDE SERPL-SCNC: 105 MMOL/L (ref 98–107)
CO2 SERPL-SCNC: 22 MMOL/L (ref 23–31)
CREAT SERPL-SCNC: 0.43 MG/DL (ref 0.72–1.25)
EOSINOPHIL # BLD: 0.1 K/MM3 (ref 0–0.7)
EOSINOPHIL NFR BLD: 1 % (ref 0–4)
ERYTHROCYTE [DISTWIDTH] IN BLOOD BY AUTOMATED COUNT: 18.5 % (ref 11.5–14.5)
GLUCOSE SERPL-MCNC: 102 MG/DL (ref 70–99)
GRANULOCYTES # BLD AUTO: 66.4 % (ref 42.2–75.2)
HCT VFR BLD AUTO: 36.5 % (ref 42–52)
HGB BLD-MCNC: 11.4 G/DL (ref 13.5–18)
LYMPHOCYTES # BLD: 2.4 K/MM3 (ref 1.2–3.4)
LYMPHOCYTES NFR BLD: 22.3 % (ref 20–51)
MCH RBC QN AUTO: 27 PG (ref 27–31)
MCHC RBC AUTO-ENTMCNC: 31 G/DL (ref 33–37)
MCV RBC AUTO: 85 FL (ref 80–100)
MONOCYTES # BLD: 1 K/MM3 (ref 0.1–0.6)
MONOCYTES NFR BLD AUTO: 9 % (ref 1.7–9.3)
NEUTROPHILS # BLD: 7.1 K/MM3 (ref 1.4–6.5)
PLATELET # BLD AUTO: 305 K/MM3 (ref 130–400)
PMV BLD AUTO: 9 FL (ref 7.4–10.4)
POTASSIUM SERPL-SCNC: 4 MMOL/L (ref 3.5–4.5)
RBC # BLD AUTO: 4.31 M/MM3 (ref 4.2–5.6)
SODIUM SERPL-SCNC: 137 MMOL/L (ref 136–145)

## 2023-02-25 NOTE — NUR
NURSING SHIFT ASSESSMENT COMPLETED. THE PATIENT REPORTED LOWER BACK AND
BUTTOCKS PAIN. PRN PAIN MEDICATIONS PROVIDED. THE PATIENT IS BEING TURNED
EVERY 2 HOURS. ENSURE OFFERED. CALL LIGHT WITHIN REACH. WILL MONITOR.

## 2023-02-25 NOTE — NUR
PATIENT SLEEPY, RESTIING IN BED. COLOSTOMY AND TREVINO EMPTIED BY THIS RN. ANTONELLA
PICC LINE PATENT, ABLE TO FLUSH.
CALL LIGHT WITH IN REACH.

## 2023-02-25 NOTE — NUR
Pt refuses to sit up for lunch.  Requests to roll onto R side.  States that he
wants to die.  States that he is going to die in "10 to 20 minutes."  Denies
pain being controlled today.  Contact Dr. Conley, inform of continued pain and
request revisiting code status based on pt comments.

## 2023-02-25 NOTE — NUR
Pt with c/o uncontrolled pain this AM.  Has had IV MS and Percocet per PRN
orders and Fentanyl patch remains intact on chest.  Have repositioned for
comfort with no success.  Notified Dr. Conley, no new orders at this time.
Dressing to coccyx and L buttock changed.  Coccyx wound measurements are
3.0cm L x 2.3cm W x 1.0cm D.  Packed with aquacel AG and placed mepilex.  L
buttock wound covered with aquacel AG and placed mepilex.

## 2023-02-26 VITALS — TEMPERATURE: 97.4 F | DIASTOLIC BLOOD PRESSURE: 62 MMHG | SYSTOLIC BLOOD PRESSURE: 113 MMHG | HEART RATE: 103 BPM

## 2023-02-26 VITALS — HEART RATE: 101 BPM | TEMPERATURE: 98.3 F | SYSTOLIC BLOOD PRESSURE: 100 MMHG | DIASTOLIC BLOOD PRESSURE: 54 MMHG

## 2023-02-26 VITALS — HEART RATE: 98 BPM | SYSTOLIC BLOOD PRESSURE: 96 MMHG | TEMPERATURE: 98.2 F | DIASTOLIC BLOOD PRESSURE: 46 MMHG

## 2023-02-26 VITALS — SYSTOLIC BLOOD PRESSURE: 112 MMHG | DIASTOLIC BLOOD PRESSURE: 61 MMHG | TEMPERATURE: 98 F | HEART RATE: 103 BPM

## 2023-02-26 VITALS — TEMPERATURE: 97.8 F | HEART RATE: 107 BPM | SYSTOLIC BLOOD PRESSURE: 91 MMHG | DIASTOLIC BLOOD PRESSURE: 56 MMHG

## 2023-02-26 VITALS — HEART RATE: 96 BPM | SYSTOLIC BLOOD PRESSURE: 103 MMHG | TEMPERATURE: 97.8 F | DIASTOLIC BLOOD PRESSURE: 52 MMHG

## 2023-02-26 LAB
ANION GAP SERPL CALC-SCNC: 11 MMOL/L (ref 7–16)
BASOPHILS # BLD: 0 K/MM3 (ref 0–0.2)
BASOPHILS NFR BLD AUTO: 0.4 % (ref 0–2)
BUN SERPL-MCNC: 17 MG/DL (ref 8–26)
CALCIUM SERPL-MCNC: 8.8 MG/DL (ref 8.4–10.2)
CHLORIDE SERPL-SCNC: 105 MMOL/L (ref 98–107)
CO2 SERPL-SCNC: 22 MMOL/L (ref 23–31)
CREAT SERPL-SCNC: 0.47 MG/DL (ref 0.72–1.25)
EOSINOPHIL # BLD: 0.1 K/MM3 (ref 0–0.7)
EOSINOPHIL NFR BLD: 1.5 % (ref 0–4)
ERYTHROCYTE [DISTWIDTH] IN BLOOD BY AUTOMATED COUNT: 18.6 % (ref 11.5–14.5)
GLUCOSE SERPL-MCNC: 103 MG/DL (ref 70–99)
GRANULOCYTES # BLD AUTO: 61 % (ref 42.2–75.2)
HCT VFR BLD AUTO: 34.9 % (ref 42–52)
HGB BLD-MCNC: 10.5 G/DL (ref 13.5–18)
LYMPHOCYTES # BLD: 2.4 K/MM3 (ref 1.2–3.4)
LYMPHOCYTES NFR BLD: 25.6 % (ref 20–51)
MCH RBC QN AUTO: 26 PG (ref 27–31)
MCHC RBC AUTO-ENTMCNC: 30 G/DL (ref 33–37)
MCV RBC AUTO: 88 FL (ref 80–100)
MONOCYTES # BLD: 1 K/MM3 (ref 0.1–0.6)
MONOCYTES NFR BLD AUTO: 10.5 % (ref 1.7–9.3)
NEUTROPHILS # BLD: 5.6 K/MM3 (ref 1.4–6.5)
PLATELET # BLD AUTO: 296 K/MM3 (ref 130–400)
PMV BLD AUTO: 9 FL (ref 7.4–10.4)
POTASSIUM SERPL-SCNC: 3.7 MMOL/L (ref 3.5–4.5)
RBC # BLD AUTO: 3.98 M/MM3 (ref 4.2–5.6)
SODIUM SERPL-SCNC: 138 MMOL/L (ref 136–145)

## 2023-02-26 NOTE — NUR
Dressing change conducted. Aquacel AG packed into sacral ulcer with mepilex
dressing to cover. Mepilex dressing placed on L buttock ulcer. Skin prep
applied to L heel with optifoam heel cup x1, kerlix x1, and tape. Pt
repositioned and placed in high-fowlers's for lunch.

## 2023-02-26 NOTE — NUR
Pt sitting up, finished breakfast. Morning medications administered per eMAR.
Shift assessment completed. Telemetry remains on. PICC in ANTONELLA remains in
place, no edema or redness noted. Colostomy remains in place with output
noted. Spear catheter remains in place. Dressings on bottom x2, in place.
Dressing in LLE remains in place. Boots on SHANE. Pt c/o 8/10 back pain;
PRN percocet administered. Fentanyl patch placed on L upper chest.
Pt rotated to R side, states he feels comfortable for now.

## 2023-02-26 NOTE — NUR
NURSING SHIFT ASSESSMENT COMPLETED. THE PATIENT APPEARED TO BE IN PAIN AND
RATED HIS BACK PAIN 8/10. PRN PAIN MEDICATION PROVIDED. THE PATIENT WAS
REPOSITIONED. THE PATIENT'S OSTOMY HAD NOTABLY SOFT BROWN STOOL PRESENT. THE
PATIENTS TREVINO HAD SILVANA URINE. A 100 MCG FENTANYL PATCH NOTED ON THE PTS LEFT
CHEST INTACT. THE PLAN OF CARE WAS TAUGHT TO THE PT AND FAMILY. QUESTIONS
REGARDING PAIN CONTROL WERE ANSWERED. THE PTS CALL LIGHT IS WITHIN REACH.

## 2023-02-27 VITALS — SYSTOLIC BLOOD PRESSURE: 103 MMHG | DIASTOLIC BLOOD PRESSURE: 49 MMHG | TEMPERATURE: 97.5 F | HEART RATE: 106 BPM

## 2023-02-27 VITALS — SYSTOLIC BLOOD PRESSURE: 111 MMHG | DIASTOLIC BLOOD PRESSURE: 60 MMHG | HEART RATE: 92 BPM | TEMPERATURE: 98.4 F

## 2023-02-27 VITALS — HEART RATE: 110 BPM | SYSTOLIC BLOOD PRESSURE: 89 MMHG | DIASTOLIC BLOOD PRESSURE: 44 MMHG | TEMPERATURE: 97.7 F

## 2023-02-27 VITALS — TEMPERATURE: 98.1 F | HEART RATE: 108 BPM | DIASTOLIC BLOOD PRESSURE: 52 MMHG | SYSTOLIC BLOOD PRESSURE: 101 MMHG

## 2023-02-27 VITALS — HEART RATE: 105 BPM | SYSTOLIC BLOOD PRESSURE: 110 MMHG | DIASTOLIC BLOOD PRESSURE: 48 MMHG | TEMPERATURE: 98.4 F

## 2023-02-27 VITALS — SYSTOLIC BLOOD PRESSURE: 104 MMHG | TEMPERATURE: 98 F | HEART RATE: 99 BPM | DIASTOLIC BLOOD PRESSURE: 56 MMHG

## 2023-02-27 LAB
ANION GAP SERPL CALC-SCNC: 12 MMOL/L (ref 7–16)
BASOPHILS # BLD: 0 K/MM3 (ref 0–0.2)
BASOPHILS NFR BLD AUTO: 0.4 % (ref 0–2)
BUN SERPL-MCNC: 9 MG/DL (ref 8–26)
CALCIUM SERPL-MCNC: 8.5 MG/DL (ref 8.4–10.2)
CHLORIDE SERPL-SCNC: 105 MMOL/L (ref 98–107)
CO2 SERPL-SCNC: 21 MMOL/L (ref 23–31)
CREAT SERPL-SCNC: 0.44 MG/DL (ref 0.72–1.25)
EOSINOPHIL # BLD: 0.1 K/MM3 (ref 0–0.7)
EOSINOPHIL NFR BLD: 1.4 % (ref 0–4)
ERYTHROCYTE [DISTWIDTH] IN BLOOD BY AUTOMATED COUNT: 18.7 % (ref 11.5–14.5)
GLUCOSE SERPL-MCNC: 87 MG/DL (ref 70–99)
GRANULOCYTES # BLD AUTO: 62 % (ref 42.2–75.2)
HCT VFR BLD AUTO: 32.9 % (ref 42–52)
HGB BLD-MCNC: 10.3 G/DL (ref 13.5–18)
LYMPHOCYTES # BLD: 2.4 K/MM3 (ref 1.2–3.4)
LYMPHOCYTES NFR BLD: 25.2 % (ref 20–51)
MCH RBC QN AUTO: 27 PG (ref 27–31)
MCHC RBC AUTO-ENTMCNC: 31 G/DL (ref 33–37)
MCV RBC AUTO: 85 FL (ref 80–100)
MONOCYTES # BLD: 1 K/MM3 (ref 0.1–0.6)
MONOCYTES NFR BLD AUTO: 10.4 % (ref 1.7–9.3)
NEUTROPHILS # BLD: 5.9 K/MM3 (ref 1.4–6.5)
PLATELET # BLD AUTO: 288 K/MM3 (ref 130–400)
PMV BLD AUTO: 8.9 FL (ref 7.4–10.4)
POTASSIUM SERPL-SCNC: 3.8 MMOL/L (ref 3.5–4.5)
RBC # BLD AUTO: 3.86 M/MM3 (ref 4.2–5.6)
SODIUM SERPL-SCNC: 138 MMOL/L (ref 136–145)

## 2023-02-27 NOTE — NUR
Patient scheduled telehealth visit with Dr. Turner, Infectious Disease.  Pt
consents to visit. Pts daughter present in the room with pts son on the phone.
Equipment set up, audio and video connections established.
 Visit conducted by MD.  No technical concerns or issues.

## 2023-02-27 NOTE — NUR
Dressings changed per order on sacrum/buttocks and left heel. Pt tolerated
well, complaints of pain shortly afterwards. PRN given. PRN pain medications
effective in controlling pain.

## 2023-02-27 NOTE — NUR
Pt awake and alert in bed. States he is in pain, PRN given. Able to answer
most questions appropriately, some confusion when answering birthdate.
Colostomy and tinajero in place and draining appropriately. Repositioned on to
left side. Bed in lowest position with call light within reach.

## 2023-02-28 VITALS — SYSTOLIC BLOOD PRESSURE: 112 MMHG | DIASTOLIC BLOOD PRESSURE: 56 MMHG | TEMPERATURE: 97.4 F | HEART RATE: 107 BPM

## 2023-02-28 VITALS — TEMPERATURE: 98.2 F | DIASTOLIC BLOOD PRESSURE: 63 MMHG | SYSTOLIC BLOOD PRESSURE: 123 MMHG | HEART RATE: 106 BPM

## 2023-02-28 VITALS — DIASTOLIC BLOOD PRESSURE: 52 MMHG | SYSTOLIC BLOOD PRESSURE: 116 MMHG

## 2023-02-28 VITALS — DIASTOLIC BLOOD PRESSURE: 56 MMHG | SYSTOLIC BLOOD PRESSURE: 106 MMHG | TEMPERATURE: 97.4 F | HEART RATE: 100 BPM

## 2023-02-28 VITALS — DIASTOLIC BLOOD PRESSURE: 48 MMHG | SYSTOLIC BLOOD PRESSURE: 90 MMHG | TEMPERATURE: 97.4 F | HEART RATE: 100 BPM

## 2023-02-28 VITALS — DIASTOLIC BLOOD PRESSURE: 52 MMHG | SYSTOLIC BLOOD PRESSURE: 112 MMHG

## 2023-02-28 VITALS — TEMPERATURE: 98.7 F | HEART RATE: 107 BPM | DIASTOLIC BLOOD PRESSURE: 50 MMHG | SYSTOLIC BLOOD PRESSURE: 90 MMHG

## 2023-02-28 VITALS — HEART RATE: 102 BPM | DIASTOLIC BLOOD PRESSURE: 30 MMHG | SYSTOLIC BLOOD PRESSURE: 117 MMHG | TEMPERATURE: 97.9 F

## 2023-02-28 LAB
ANION GAP SERPL CALC-SCNC: 14 MMOL/L (ref 7–16)
BASOPHILS # BLD: 0 K/MM3 (ref 0–0.2)
BASOPHILS NFR BLD AUTO: 0.5 % (ref 0–2)
BUN SERPL-MCNC: 9 MG/DL (ref 8–26)
CALCIUM SERPL-MCNC: 8.5 MG/DL (ref 8.4–10.2)
CHLORIDE SERPL-SCNC: 106 MMOL/L (ref 98–107)
CO2 SERPL-SCNC: 19 MMOL/L (ref 23–31)
CREAT SERPL-SCNC: 0.43 MG/DL (ref 0.72–1.25)
EOSINOPHIL # BLD: 0.1 K/MM3 (ref 0–0.7)
EOSINOPHIL NFR BLD: 1.4 % (ref 0–4)
ERYTHROCYTE [DISTWIDTH] IN BLOOD BY AUTOMATED COUNT: 18.8 % (ref 11.5–14.5)
GLUCOSE SERPL-MCNC: 89 MG/DL (ref 70–99)
GRANULOCYTES # BLD AUTO: 61.5 % (ref 42.2–75.2)
HCT VFR BLD AUTO: 32.5 % (ref 42–52)
HGB BLD-MCNC: 10.4 G/DL (ref 13.5–18)
LYMPHOCYTES # BLD: 1.9 K/MM3 (ref 1.2–3.4)
LYMPHOCYTES NFR BLD: 24.5 % (ref 20–51)
MCH RBC QN AUTO: 27 PG (ref 27–31)
MCHC RBC AUTO-ENTMCNC: 32 G/DL (ref 33–37)
MCV RBC AUTO: 84 FL (ref 80–100)
MONOCYTES # BLD: 0.9 K/MM3 (ref 0.1–0.6)
MONOCYTES NFR BLD AUTO: 11.3 % (ref 1.7–9.3)
NEUTROPHILS # BLD: 4.8 K/MM3 (ref 1.4–6.5)
PLATELET # BLD AUTO: 301 K/MM3 (ref 130–400)
PMV BLD AUTO: 9.2 FL (ref 7.4–10.4)
POTASSIUM SERPL-SCNC: 3.5 MMOL/L (ref 3.5–4.5)
RBC # BLD AUTO: 3.89 M/MM3 (ref 4.2–5.6)
SODIUM SERPL-SCNC: 139 MMOL/L (ref 136–145)

## 2023-02-28 NOTE — NUR
Patient is resting in bed, breakfast on his table. States continueos pain,
morphine to be provided. Taking PO meds with no problems. Assessment
completed. No other needs at this time. Call light within reach.

## 2023-02-28 NOTE — NUR
spoke with David at [facility] who advised they do not have a bed
for patient today. SW updated patient's daughter, Ivy.

## 2023-02-28 NOTE — NUR
PATIENT ASSESSED AND MEDICATIONS GIVEN. ON SPECIALTY BED. 8/10 BACK PAIN,
GIVEN MORPHINE AND PERCOCET 1X. NS CONTINUES AT 50ML/HR. PICC IN PLACE WITH
GOOD BLOOD RETURN AND FLUSHES WELL. DISCHARGE TO [facility] PENDING BED
AVAILABILITY. DRESSING CHANGE DONE ON DAY SHIFT. NO MOVEMENT IN BLE. HAS
MULTIPLE WOUNDS. TERVINO IN PLACE WITH MINIMAL OUTPUT. BED IN LOWEST POSITION.
CALL LIGHT IN REACH.

## 2023-02-28 NOTE — NUR
Patient continues getting pain medication as needed. Right now he is sleeping.
Pt's daughter requested to contact DR. Noel Sanchez for a consult of pain
management, reported to [facility] who contacted Dr. Sanchez. Pt's daughter asks
if possible that the visit of Dr Sanchez will be after 12pm for her to be
able to translate to her father. Report will be given to olivia RN.

## 2023-03-01 VITALS — SYSTOLIC BLOOD PRESSURE: 102 MMHG | HEART RATE: 101 BPM | DIASTOLIC BLOOD PRESSURE: 51 MMHG | TEMPERATURE: 98.8 F

## 2023-03-01 VITALS — SYSTOLIC BLOOD PRESSURE: 114 MMHG | DIASTOLIC BLOOD PRESSURE: 59 MMHG | HEART RATE: 112 BPM | TEMPERATURE: 99 F

## 2023-03-01 VITALS — DIASTOLIC BLOOD PRESSURE: 50 MMHG | HEART RATE: 102 BPM | SYSTOLIC BLOOD PRESSURE: 114 MMHG | TEMPERATURE: 98.9 F

## 2023-03-01 VITALS — HEART RATE: 93 BPM | DIASTOLIC BLOOD PRESSURE: 64 MMHG | TEMPERATURE: 98.5 F | SYSTOLIC BLOOD PRESSURE: 104 MMHG

## 2023-03-01 VITALS — TEMPERATURE: 97.7 F | DIASTOLIC BLOOD PRESSURE: 70 MMHG | SYSTOLIC BLOOD PRESSURE: 120 MMHG | HEART RATE: 100 BPM

## 2023-03-01 LAB
ANION GAP SERPL CALC-SCNC: 11 MMOL/L (ref 7–16)
BASOPHILS # BLD: 0 K/MM3 (ref 0–0.2)
BASOPHILS NFR BLD AUTO: 0.6 % (ref 0–2)
BUN SERPL-MCNC: 10 MG/DL (ref 8–26)
CALCIUM SERPL-MCNC: 8.6 MG/DL (ref 8.4–10.2)
CHLORIDE SERPL-SCNC: 107 MMOL/L (ref 98–107)
CO2 SERPL-SCNC: 21 MMOL/L (ref 23–31)
CREAT SERPL-SCNC: 0.4 MG/DL (ref 0.72–1.25)
EOSINOPHIL # BLD: 0.1 K/MM3 (ref 0–0.7)
EOSINOPHIL NFR BLD: 1.8 % (ref 0–4)
ERYTHROCYTE [DISTWIDTH] IN BLOOD BY AUTOMATED COUNT: 18.7 % (ref 11.5–14.5)
GLUCOSE SERPL-MCNC: 96 MG/DL (ref 70–99)
GRANULOCYTES # BLD AUTO: 59.3 % (ref 42.2–75.2)
HCT VFR BLD AUTO: 31 % (ref 42–52)
HGB BLD-MCNC: 10.1 G/DL (ref 13.5–18)
LYMPHOCYTES # BLD: 1.9 K/MM3 (ref 1.2–3.4)
LYMPHOCYTES NFR BLD: 26.7 % (ref 20–51)
MCH RBC QN AUTO: 27 PG (ref 27–31)
MCHC RBC AUTO-ENTMCNC: 33 G/DL (ref 33–37)
MCV RBC AUTO: 83 FL (ref 80–100)
MONOCYTES # BLD: 0.8 K/MM3 (ref 0.1–0.6)
MONOCYTES NFR BLD AUTO: 11 % (ref 1.7–9.3)
NEUTROPHILS # BLD: 4.2 K/MM3 (ref 1.4–6.5)
PLATELET # BLD AUTO: 276 K/MM3 (ref 130–400)
PMV BLD AUTO: 9 FL (ref 7.4–10.4)
POTASSIUM SERPL-SCNC: 3.2 MMOL/L (ref 3.5–4.5)
RBC # BLD AUTO: 3.75 M/MM3 (ref 4.2–5.6)
SODIUM SERPL-SCNC: 139 MMOL/L (ref 136–145)

## 2023-03-01 NOTE — NUR
PATIENT ASSESSED AND NIGHTLY MEDICATIONS GIVEN. PICC LINE FLUSHES AND HAS
BLOOD RETURN. GIVEN PERCOCET AND MORPHINE 1X EACH. POSSIBLE DISCHARGE PENDING
OPEN BED AT [facility] IN . DRESSING CHANGE DONE TODAY ON DAY SHIFT. TREVINO WITH
SILVANA OUTPUT. IV FLUIDS ARE NO LONGER RUNNING. OSTOMY WITH BROWN/LIQUID
OUTPUT. THERE IS A PAIN CONSULT ORDERED FOR TODAY. CALL LIGHT IN REACH. BED IN
LOWEST POSITION.

## 2023-03-01 NOTE — NUR
Dr. Sanchez at bedside to evaluate pt for pain control.  Reports that there
are no changes that can be made at this time.  Pt appears comfortable and pain
well controlled at this time.  Dr. Sanchez would be willing to reassess
upon d/c from hospital.  Will contact Dr. Montemayor with findings.

## 2023-03-01 NOTE — NUR
Shift summary: Pt Ox4, VSS.  Pain appears to be well controlled today,
although pt reports lowest pain rating to be "7-8" but is smiling and joking
with family and staff.  Alternated between morphine and percocet, placed new
fentanyl patch to R chest today.  Dr. Sanchez at bedside for assessment, no
new orders.  Pt did not eat breakfast or lunch, daughter/wife brought in food
from home for supper which they report pt eating.  Daughter also requests that
pt drink glo while taking PO meds.  Daughter is requesting to speak with Dr. Montemayor r/t abx completion date and having zinc level checked.

## 2023-03-02 VITALS — TEMPERATURE: 97.5 F | HEART RATE: 112 BPM | SYSTOLIC BLOOD PRESSURE: 111 MMHG | DIASTOLIC BLOOD PRESSURE: 58 MMHG

## 2023-03-02 VITALS — SYSTOLIC BLOOD PRESSURE: 107 MMHG | TEMPERATURE: 98 F | DIASTOLIC BLOOD PRESSURE: 42 MMHG | HEART RATE: 99 BPM

## 2023-03-02 VITALS — TEMPERATURE: 98.3 F | HEART RATE: 100 BPM | DIASTOLIC BLOOD PRESSURE: 50 MMHG | SYSTOLIC BLOOD PRESSURE: 102 MMHG

## 2023-03-02 VITALS — HEART RATE: 108 BPM | TEMPERATURE: 98.4 F | DIASTOLIC BLOOD PRESSURE: 52 MMHG | SYSTOLIC BLOOD PRESSURE: 109 MMHG

## 2023-03-02 VITALS — HEART RATE: 110 BPM | SYSTOLIC BLOOD PRESSURE: 107 MMHG | DIASTOLIC BLOOD PRESSURE: 63 MMHG | TEMPERATURE: 97.8 F

## 2023-03-02 VITALS — DIASTOLIC BLOOD PRESSURE: 52 MMHG | SYSTOLIC BLOOD PRESSURE: 103 MMHG | TEMPERATURE: 98 F | HEART RATE: 97 BPM

## 2023-03-02 LAB
ANION GAP SERPL CALC-SCNC: 13 MMOL/L (ref 7–16)
BASOPHILS # BLD: 0 K/MM3 (ref 0–0.2)
BASOPHILS NFR BLD AUTO: 0.5 % (ref 0–2)
BUN SERPL-MCNC: 7 MG/DL (ref 8–26)
CALCIUM SERPL-MCNC: 8.9 MG/DL (ref 8.4–10.2)
CHLORIDE SERPL-SCNC: 106 MMOL/L (ref 98–107)
CO2 SERPL-SCNC: 22 MMOL/L (ref 23–31)
CREAT SERPL-SCNC: 0.43 MG/DL (ref 0.72–1.25)
EOSINOPHIL # BLD: 0.2 K/MM3 (ref 0–0.7)
EOSINOPHIL NFR BLD: 2.3 % (ref 0–4)
ERYTHROCYTE [DISTWIDTH] IN BLOOD BY AUTOMATED COUNT: 19.2 % (ref 11.5–14.5)
GLUCOSE SERPL-MCNC: 92 MG/DL (ref 70–99)
GRANULOCYTES # BLD AUTO: 57.1 % (ref 42.2–75.2)
HCT VFR BLD AUTO: 34.6 % (ref 42–52)
HGB BLD-MCNC: 10.8 G/DL (ref 13.5–18)
LYMPHOCYTES # BLD: 2.1 K/MM3 (ref 1.2–3.4)
LYMPHOCYTES NFR BLD: 26.9 % (ref 20–51)
MAGNESIUM SERPL-MCNC: 1.5 MG/DL (ref 1.6–2.6)
MCH RBC QN AUTO: 27 PG (ref 27–31)
MCHC RBC AUTO-ENTMCNC: 31 G/DL (ref 33–37)
MCV RBC AUTO: 85 FL (ref 80–100)
MONOCYTES # BLD: 1 K/MM3 (ref 0.1–0.6)
MONOCYTES NFR BLD AUTO: 12.7 % (ref 1.7–9.3)
NEUTROPHILS # BLD: 4.4 K/MM3 (ref 1.4–6.5)
PHOSPHATE SERPL-MCNC: 2.2 MG/DL (ref 2.3–4.7)
PLATELET # BLD AUTO: 304 K/MM3 (ref 130–400)
PMV BLD AUTO: 9.4 FL (ref 7.4–10.4)
POTASSIUM SERPL-SCNC: 3.4 MMOL/L (ref 3.5–4.5)
RBC # BLD AUTO: 4.08 M/MM3 (ref 4.2–5.6)
SODIUM SERPL-SCNC: 141 MMOL/L (ref 136–145)

## 2023-03-02 NOTE — NUR
[facility], [facility].  Pt
consents to visit.  Equipment set up, audio and video connections established.
 Visit conducted by MD.  No technical concerns or issues.

## 2023-03-02 NOTE — NUR
Patient is lying in bed, alert and oriented x 4, constant pain, giving PRN as
allowed. Assessment completed, call light within reach.

## 2023-03-02 NOTE — NUR
was contacted by David at [facility] who advised they did not have a
bed available for patient today. BALTAZAR Luo faxed clinical updates.
 
BALTAZAR provided update to patient's daughter who requested an update on a DME
script she spoke with Hospitalist team about for a specialized mattress at
home. BALTAZAR contacted [facility] and was advised they would email SW a prescription
for mattress for Hospitalist to sign as well as documentation criteria.

## 2023-03-03 VITALS — HEART RATE: 105 BPM | SYSTOLIC BLOOD PRESSURE: 85 MMHG | DIASTOLIC BLOOD PRESSURE: 44 MMHG | TEMPERATURE: 98.6 F

## 2023-03-03 VITALS — HEART RATE: 103 BPM | SYSTOLIC BLOOD PRESSURE: 101 MMHG | TEMPERATURE: 97.8 F | DIASTOLIC BLOOD PRESSURE: 57 MMHG

## 2023-03-03 VITALS — DIASTOLIC BLOOD PRESSURE: 59 MMHG | TEMPERATURE: 97.8 F | HEART RATE: 112 BPM | SYSTOLIC BLOOD PRESSURE: 96 MMHG

## 2023-03-03 VITALS — TEMPERATURE: 97.7 F | SYSTOLIC BLOOD PRESSURE: 100 MMHG | DIASTOLIC BLOOD PRESSURE: 54 MMHG | HEART RATE: 118 BPM

## 2023-03-03 VITALS — HEART RATE: 108 BPM | DIASTOLIC BLOOD PRESSURE: 49 MMHG | TEMPERATURE: 97.7 F | SYSTOLIC BLOOD PRESSURE: 88 MMHG

## 2023-03-03 VITALS — DIASTOLIC BLOOD PRESSURE: 48 MMHG | SYSTOLIC BLOOD PRESSURE: 92 MMHG | HEART RATE: 102 BPM | TEMPERATURE: 97.7 F

## 2023-03-03 VITALS — DIASTOLIC BLOOD PRESSURE: 53 MMHG | SYSTOLIC BLOOD PRESSURE: 114 MMHG

## 2023-03-03 VITALS — DIASTOLIC BLOOD PRESSURE: 55 MMHG | SYSTOLIC BLOOD PRESSURE: 106 MMHG

## 2023-03-03 LAB
ANION GAP SERPL CALC-SCNC: 13 MMOL/L (ref 7–16)
BASOPHILS # BLD: 0.1 K/MM3 (ref 0–0.2)
BASOPHILS NFR BLD AUTO: 0.7 % (ref 0–2)
BUN SERPL-MCNC: 8 MG/DL (ref 8–26)
CALCIUM SERPL-MCNC: 8.9 MG/DL (ref 8.4–10.2)
CHLORIDE SERPL-SCNC: 105 MMOL/L (ref 98–107)
CO2 SERPL-SCNC: 22 MMOL/L (ref 23–31)
CREAT SERPL-SCNC: 0.42 MG/DL (ref 0.72–1.25)
EOSINOPHIL # BLD: 0.2 K/MM3 (ref 0–0.7)
EOSINOPHIL NFR BLD: 2.5 % (ref 0–4)
ERYTHROCYTE [DISTWIDTH] IN BLOOD BY AUTOMATED COUNT: 19.5 % (ref 11.5–14.5)
GLUCOSE SERPL-MCNC: 82 MG/DL (ref 70–99)
GRANULOCYTES # BLD AUTO: 53.1 % (ref 42.2–75.2)
HCT VFR BLD AUTO: 31.9 % (ref 42–52)
HGB BLD-MCNC: 10.1 G/DL (ref 13.5–18)
LYMPHOCYTES # BLD: 2.3 K/MM3 (ref 1.2–3.4)
LYMPHOCYTES NFR BLD: 30.4 % (ref 20–51)
MAGNESIUM SERPL-MCNC: 1.7 MG/DL (ref 1.6–2.6)
MCH RBC QN AUTO: 27 PG (ref 27–31)
MCHC RBC AUTO-ENTMCNC: 32 G/DL (ref 33–37)
MCV RBC AUTO: 84 FL (ref 80–100)
MONOCYTES # BLD: 1 K/MM3 (ref 0.1–0.6)
MONOCYTES NFR BLD AUTO: 12.8 % (ref 1.7–9.3)
NEUTROPHILS # BLD: 4 K/MM3 (ref 1.4–6.5)
PLATELET # BLD AUTO: 299 K/MM3 (ref 130–400)
PMV BLD AUTO: 9.5 FL (ref 7.4–10.4)
POTASSIUM SERPL-SCNC: 3.6 MMOL/L (ref 3.5–4.5)
RBC # BLD AUTO: 3.79 M/MM3 (ref 4.2–5.6)
SODIUM SERPL-SCNC: 140 MMOL/L (ref 136–145)

## 2023-03-03 NOTE — NUR
Dr. Montemayor tried to visit with daughter but she had left before Dr. Montemayor
could get to the room. I left a message with her nurse to notify me when she
returns. I called PCP Dr. Stinson's office to discuss the possibility of him
managing medications if the pt was placed in Palliative Care at home.
Currently the pt is not in Palliative care. Waiting on response.

## 2023-03-03 NOTE — NUR
Dressing changed on sacrum, buttock, and left foot. Pain medication given,
pain controlled with PRNs. Repositioned onto left side.

## 2023-03-03 NOTE — NUR
attended ethics discussion with Rolly and Gale Honeycutt.  Worker
met with Dr Montemayor and discussed discharge planning issues/concerns.

## 2023-03-03 NOTE — NUR
NURSING SHIFT ASSESSMENT COMPLETED. THE PATIENT WAS MOANING IN DISCOMFORT AND
FAMILY WAS AT BEDSIDE. PRN PAIN MEDICATION PROVIDED. THE PATIENT WAS
REPOSITIONED FOR COMFORT AND SKIN INTEGRITY. THE PLAN OF CARE AND EVENING
MEDICATIONS DISCUSSED WITH FAMILY. A DRINK WAS PROVIDED. CALL LIGHT WITHIN
REACH. WILL MONITOR.

## 2023-03-03 NOTE — NUR
Pt has had only 100 cc of urine from tinajero this shift, bladder scan showing
448 cc in bladder, tubing manipulated and crede manuver done, 600 cc of clear
urine out of catheter afterwards.

## 2023-03-04 VITALS — SYSTOLIC BLOOD PRESSURE: 105 MMHG | DIASTOLIC BLOOD PRESSURE: 47 MMHG | HEART RATE: 114 BPM | TEMPERATURE: 99.5 F

## 2023-03-04 VITALS — SYSTOLIC BLOOD PRESSURE: 124 MMHG | DIASTOLIC BLOOD PRESSURE: 60 MMHG | TEMPERATURE: 98.5 F | HEART RATE: 105 BPM

## 2023-03-04 VITALS — TEMPERATURE: 98.1 F | HEART RATE: 105 BPM | DIASTOLIC BLOOD PRESSURE: 58 MMHG | SYSTOLIC BLOOD PRESSURE: 110 MMHG

## 2023-03-04 VITALS — TEMPERATURE: 98.4 F | SYSTOLIC BLOOD PRESSURE: 107 MMHG | HEART RATE: 117 BPM | DIASTOLIC BLOOD PRESSURE: 61 MMHG

## 2023-03-04 VITALS — DIASTOLIC BLOOD PRESSURE: 54 MMHG | SYSTOLIC BLOOD PRESSURE: 100 MMHG | HEART RATE: 124 BPM | TEMPERATURE: 98.1 F

## 2023-03-04 VITALS — TEMPERATURE: 97.8 F | DIASTOLIC BLOOD PRESSURE: 58 MMHG | HEART RATE: 112 BPM | SYSTOLIC BLOOD PRESSURE: 109 MMHG

## 2023-03-05 VITALS — DIASTOLIC BLOOD PRESSURE: 54 MMHG | TEMPERATURE: 97.8 F | SYSTOLIC BLOOD PRESSURE: 103 MMHG | HEART RATE: 118 BPM

## 2023-03-05 VITALS — DIASTOLIC BLOOD PRESSURE: 43 MMHG | TEMPERATURE: 97.4 F | HEART RATE: 101 BPM | SYSTOLIC BLOOD PRESSURE: 125 MMHG

## 2023-03-05 VITALS — DIASTOLIC BLOOD PRESSURE: 65 MMHG | SYSTOLIC BLOOD PRESSURE: 116 MMHG | TEMPERATURE: 97.3 F | HEART RATE: 106 BPM

## 2023-03-05 VITALS — DIASTOLIC BLOOD PRESSURE: 46 MMHG | SYSTOLIC BLOOD PRESSURE: 91 MMHG | TEMPERATURE: 97.3 F | HEART RATE: 107 BPM

## 2023-03-05 VITALS — SYSTOLIC BLOOD PRESSURE: 113 MMHG | TEMPERATURE: 98.1 F | DIASTOLIC BLOOD PRESSURE: 61 MMHG | HEART RATE: 104 BPM

## 2023-03-05 VITALS — SYSTOLIC BLOOD PRESSURE: 103 MMHG | TEMPERATURE: 98.1 F | DIASTOLIC BLOOD PRESSURE: 57 MMHG | HEART RATE: 109 BPM

## 2023-03-05 LAB
ANION GAP SERPL CALC-SCNC: 12 MMOL/L (ref 7–16)
BASOPHILS # BLD: 0 K/MM3 (ref 0–0.2)
BASOPHILS NFR BLD AUTO: 0.4 % (ref 0–2)
BUN SERPL-MCNC: 10 MG/DL (ref 8–26)
CALCIUM SERPL-MCNC: 9.3 MG/DL (ref 8.4–10.2)
CHLORIDE SERPL-SCNC: 104 MMOL/L (ref 98–107)
CO2 SERPL-SCNC: 24 MMOL/L (ref 23–31)
CREAT SERPL-SCNC: 0.43 MG/DL (ref 0.72–1.25)
EOSINOPHIL # BLD: 0.2 K/MM3 (ref 0–0.7)
EOSINOPHIL NFR BLD: 2.1 % (ref 0–4)
ERYTHROCYTE [DISTWIDTH] IN BLOOD BY AUTOMATED COUNT: 19.9 % (ref 11.5–14.5)
GLUCOSE SERPL-MCNC: 86 MG/DL (ref 70–99)
GRANULOCYTES # BLD AUTO: 58.4 % (ref 42.2–75.2)
HCT VFR BLD AUTO: 38.3 % (ref 42–52)
HGB BLD-MCNC: 12.2 G/DL (ref 13.5–18)
LYMPHOCYTES # BLD: 2 K/MM3 (ref 1.2–3.4)
LYMPHOCYTES NFR BLD: 25 % (ref 20–51)
MAGNESIUM SERPL-MCNC: 1.6 MG/DL (ref 1.6–2.6)
MCH RBC QN AUTO: 27 PG (ref 27–31)
MCHC RBC AUTO-ENTMCNC: 32 G/DL (ref 33–37)
MCV RBC AUTO: 84 FL (ref 80–100)
MONOCYTES # BLD: 1.1 K/MM3 (ref 0.1–0.6)
MONOCYTES NFR BLD AUTO: 13.5 % (ref 1.7–9.3)
NEUTROPHILS # BLD: 4.7 K/MM3 (ref 1.4–6.5)
PLATELET # BLD AUTO: 316 K/MM3 (ref 130–400)
PMV BLD AUTO: 9.1 FL (ref 7.4–10.4)
POTASSIUM SERPL-SCNC: 4 MMOL/L (ref 3.5–4.5)
RBC # BLD AUTO: 4.57 M/MM3 (ref 4.2–5.6)
SODIUM SERPL-SCNC: 140 MMOL/L (ref 136–145)

## 2023-03-05 NOTE — NUR
SHIFT NURSING ASSESSMENT COMPLETED. THE PATIENT IS REPORTING PAIN, MOANS IN
PAIN AND ASKS FOR STRONG PAIN MEDS. PRN PAIN MEDICATIONS BEING PROVIDED. THE
PATIENT WAS REPOSITIONED AND OFFERED WATER AND APPLESAUCE. CALL LIGHT WITHIN
REACH. WILL MONITOR.

## 2023-03-06 VITALS — TEMPERATURE: 98 F | DIASTOLIC BLOOD PRESSURE: 52 MMHG | HEART RATE: 96 BPM | SYSTOLIC BLOOD PRESSURE: 107 MMHG

## 2023-03-06 VITALS — SYSTOLIC BLOOD PRESSURE: 103 MMHG | DIASTOLIC BLOOD PRESSURE: 50 MMHG | TEMPERATURE: 97.9 F | HEART RATE: 103 BPM

## 2023-03-06 VITALS — HEART RATE: 103 BPM | DIASTOLIC BLOOD PRESSURE: 52 MMHG | TEMPERATURE: 98 F | SYSTOLIC BLOOD PRESSURE: 112 MMHG

## 2023-03-06 VITALS — TEMPERATURE: 97.6 F | SYSTOLIC BLOOD PRESSURE: 111 MMHG | DIASTOLIC BLOOD PRESSURE: 68 MMHG | HEART RATE: 127 BPM

## 2023-03-06 VITALS — HEART RATE: 120 BPM | DIASTOLIC BLOOD PRESSURE: 56 MMHG | SYSTOLIC BLOOD PRESSURE: 107 MMHG | TEMPERATURE: 97.9 F

## 2023-03-06 NOTE — NUR
Telehealth visit conducted with Dr. Michael Turner, Infectious Disease.  Patient
consented to visit.  Telecommunication initiated without any difficulties
during exam. Communication was difficult due to patient condition.
All questions were answered by Dr. Turner

## 2023-03-06 NOTE — NUR
Maggy, at [facility], contacted BALTAZAR. She states that the patient has Medicare [employer]
and they are not in-network with them. She referred SW to [facility] [place].
 
BALTAZAR contacted and faxed the patient's records and the IV antibiotic script to
Pooja at [facility]. Pooja states that they do take and are in-network with
Medicare [employer]. Pooja states that they cannot send the Rocephin until the
patient discharges, so the patient would need to receive his antibiotic at the
hospital tomorrow. The IV antibiotics would then be couriered to the patient's
home Wednesday, around noon and someone would need to be there to sign for it.
 
BALTAZAR notified Aniyah at [facility] of the updated delivery date of the IV
antibioitics and requested that they visit the patient then. BALTAZAR contacted and
updated the patient's daughter, Aubree, on the above. Aubree verbalized
understanding and is in agreement to the plan.

## 2023-03-06 NOTE — NUR
Morphine administered IV and Percocet adminstered po for c/o pain. Pt
moaning/crying out in pain and requesting pain medication.

## 2023-03-06 NOTE — NUR
Renetta with [facility] notified that this nurse is unable to get blood return from
PICC line. She ordered a CXR to confirm placement.

## 2023-03-06 NOTE — NUR
SHIFT NURSING ASSESSMENT COMPLETED. THE PATIENT CONTINUES TO HAVE PAIN THAT IS
SOMETIMES CONTROLLED WITH THE ORDERED PRN MEDICATIONS AND SOMETIMES NOT. THE
PATIENT MOANS FREQUENTLY AND APPEARS TO BE IN DISCOMFORT AND REPORTS
DISCOMFORT. WILL MONITOR. THE PATIENT IS BEING TURNED FREQUENTLY D/T COCCYX
WOUNDS AND HEEL DEEP TISSUE INJURY. WILL MONITOR.

## 2023-03-06 NOTE — NUR
RN Case Manager notified BALTAZAR that the patient's daughter shared during rounds,
that she would now like to take the patient home and feels comfortable
administering the IV antibiotics. The patient is now only receiving IV
Rocephin once a day.
 
BALTAZAR contacted Maria A at [facility] and updated her on the above. Maria A states
that they would be able to accept the patient back and are able to accomodate
the IV antibotics. She asks that the patient discharge as early as possible
tomorrow, so they can go out to visit the patient.
 
BALTAZAR contacted and faxed the patient's information and the IV antibiotic script
to Miranda at [facility]. Miranda plans to run the patient's insurance and contact the
patient's daughter.
 
BALTAZAR obtained the air mattress script and the PA signed it. BALTAZAR contacted and
faxed the script to Myron at [facility] and notified him of the discharge date for
tomorrow.
 
BALTAZAR contacted the patient's daughter, Aubree, and reviewed the above with her.
Aubree confirms how she is comfortable with and would like for the patient to
return back home with her and her family. She is in agreement to discharge
tomorrow and states that she can pick the patient up at 1200. She plans to be
at the hospital today at 1700 to watch the RN administer the IV antibiotic. BALTAZAR
updated the patient's RN of this. Aubree asks if home health can be present
tomorrow for his first dose at home. BALTAZAR read the IM form outloud to Aubree
over the phone. Aubree verbalized understanding and gave SW approval to sign
the form on her behalf.
 
BALTAZAR contacted Maria A at [facility] and updated her about the daughter's
request. Maria A states that they can have their RN visit the patient tomorrow
for his first dose at home.

## 2023-03-06 NOTE — NUR
Pt rec'd 2 Percocet at 1322 and has rested in bed without grimacing,
crying/moaning out or c/o pain. Has not requested pain medication since that
time. Pt's daughter called this nurse a total of 3 times today to check on
patient's heart rate. Daughter also spoke with SW this am and requested to be
present when this nurse administered IV Rocephin this evening. This nurse
communicated through SW with patient that the IV antibiotic could be
administered as early as 1700- dtr arrived at 1700 and presented to the nurses
station several times. She also called using call button several times. Upon
entering the room at 1730, the daughter appeared agitated that this nurse
wasn't in the room at 1700. This nurse was able to communicate with the
daughter and deescalate the situation. This nurse spent 45 minutes
communicating with dtr, teaching IV administration of IV rocephin, wound
dressing changes to coccyx/buttock, left heel. The daughter apologized for
being agitated and stated she was worried about the pt's pain and lack of
appetite today. Pt refused all meals. Dtr did bring in high protein/low sugar
shake for pt to drink as well as green tea and keto yogurt and requests that
staff encourages he drink both- to decrease inflammation which she feels with
decrease his overall pain level. On specialty bed-repositions q 30 minutes.
Prima boots on bilateral heels.

## 2023-03-07 VITALS — TEMPERATURE: 99.6 F | SYSTOLIC BLOOD PRESSURE: 105 MMHG | DIASTOLIC BLOOD PRESSURE: 62 MMHG | HEART RATE: 118 BPM

## 2023-03-07 VITALS — TEMPERATURE: 98.4 F | DIASTOLIC BLOOD PRESSURE: 59 MMHG | HEART RATE: 100 BPM | SYSTOLIC BLOOD PRESSURE: 103 MMHG

## 2023-03-07 VITALS — SYSTOLIC BLOOD PRESSURE: 100 MMHG | HEART RATE: 104 BPM | TEMPERATURE: 97.4 F | DIASTOLIC BLOOD PRESSURE: 54 MMHG

## 2023-03-07 VITALS — HEART RATE: 98 BPM | SYSTOLIC BLOOD PRESSURE: 99 MMHG | TEMPERATURE: 98.4 F | DIASTOLIC BLOOD PRESSURE: 56 MMHG

## 2023-03-07 VITALS — TEMPERATURE: 98.2 F | HEART RATE: 116 BPM | DIASTOLIC BLOOD PRESSURE: 65 MMHG | SYSTOLIC BLOOD PRESSURE: 91 MMHG

## 2023-03-07 LAB
ANION GAP SERPL CALC-SCNC: 13 MMOL/L (ref 7–16)
BASOPHILS # BLD: 0 K/MM3 (ref 0–0.2)
BASOPHILS NFR BLD AUTO: 0.3 % (ref 0–2)
BUN SERPL-MCNC: 12 MG/DL (ref 8–26)
CALCIUM SERPL-MCNC: 8.7 MG/DL (ref 8.4–10.2)
CHLORIDE SERPL-SCNC: 104 MMOL/L (ref 98–107)
CO2 SERPL-SCNC: 22 MMOL/L (ref 23–31)
CREAT SERPL-SCNC: 0.49 MG/DL (ref 0.72–1.25)
EOSINOPHIL # BLD: 0.1 K/MM3 (ref 0–0.7)
EOSINOPHIL NFR BLD: 1.4 % (ref 0–4)
ERYTHROCYTE [DISTWIDTH] IN BLOOD BY AUTOMATED COUNT: 20.1 % (ref 11.5–14.5)
GLUCOSE SERPL-MCNC: 107 MG/DL (ref 70–99)
GRANULOCYTES # BLD AUTO: 67 % (ref 42.2–75.2)
HCT VFR BLD AUTO: 36.6 % (ref 42–52)
HGB BLD-MCNC: 11.3 G/DL (ref 13.5–18)
LYMPHOCYTES # BLD: 1.7 K/MM3 (ref 1.2–3.4)
LYMPHOCYTES NFR BLD: 18.5 % (ref 20–51)
MCH RBC QN AUTO: 27 PG (ref 27–31)
MCHC RBC AUTO-ENTMCNC: 31 G/DL (ref 33–37)
MCV RBC AUTO: 86 FL (ref 80–100)
MONOCYTES # BLD: 1.1 K/MM3 (ref 0.1–0.6)
MONOCYTES NFR BLD AUTO: 11.6 % (ref 1.7–9.3)
NEUTROPHILS # BLD: 6.2 K/MM3 (ref 1.4–6.5)
PLATELET # BLD AUTO: 335 K/MM3 (ref 130–400)
PMV BLD AUTO: 9.1 FL (ref 7.4–10.4)
POTASSIUM SERPL-SCNC: 3.8 MMOL/L (ref 3.5–4.5)
RBC # BLD AUTO: 4.27 M/MM3 (ref 4.2–5.6)
SODIUM SERPL-SCNC: 139 MMOL/L (ref 136–145)

## 2023-03-07 PROCEDURE — 02HV33Z INSERTION OF INFUSION DEVICE INTO SUPERIOR VENA CAVA, PERCUTANEOUS APPROACH: ICD-10-PCS

## 2023-03-07 NOTE — NUR
Pt discharge instructions given to daughter. All questions answered. Pt
education on PICC provided by BETHANY Sharma.

## 2023-03-07 NOTE — NUR
Pt laying in bed. Morning medications administered per eMAR. Shift assessment
completed. Telemetry on. Fentanyl patch replaced, now on L upper chest.
PICC in R upper arm intact, exchanged by BETHANY Winter this morning.
Colostomy remains intact with moderate amount of output. Tinajero
catheter replaced with 10cc obtained from balloon, catheter tip intact. 16Fr
tinajero catheter inserted using sterile technique. Balloon inflated with 10cc.
Clear yellow urine obtained. Securement device placed on R inner thigh.
Dressing on sacrum, L buttock, and L heel replaced per orders. Pt repositioned
to R side. No further request at this time. Call light within reach.

## 2023-03-07 NOTE — NUR
PATIENT ASSESSED AND GIVEN NIGHTLY MEDICATIONS. HE IS AOX4 AND PLEASANT TO
SPEAK WITH. COLOSTOMY WITH LIQUID/SEMI-FORMED BROWN STOOL. TREVINO WITH SILVANA
OUTPUT. GIVEN PERCOCET 1X. POSSIBLE DISCHARGE HOME TODAY. PICC LINE FLUSHES
BUT HAS NO BLOOD RETURN, CHEST XRAY SHOWED PICC IN PLACE. ST ON TELE. ON
SPECIALTY BED. K- 4.0, ON POTASSIUM PROTOCAL. CALL LIGHT IN PLACE. BED IN
LOWEST POSITION.

## 2023-03-07 NOTE — NUR
Patient scheduled telehealth visit with Dr. Turner, Infectious Disease.  Pt
consents to visit.  Equipment set up, audio and video connections established.
 Visit conducted by MD.  No technical concerns or issues. RN present in room
to answer questions.

## 2023-03-07 NOTE — NUR
The clinical team is ready to discharge the patient today. BALTAZAR notified Pooja
at [facility] in [place]. Pooja confirms they will be shipping the patient's meds
today, so that they will be delivered around noon tomorrow. BALTAZAR notified and
faxed discharge orders to Roseanne at [facility].
 
The patient is to discharge back home with his family today, 3/7, with home
health services for skilled nursing/PT/OT/wound care from [facility] and
outpatient IV antibiotics from [facility]. Transportation by private
vehicle, via the patient's family. No additional needs at this time.

## 2023-03-15 ENCOUNTER — HOSPITAL ENCOUNTER (EMERGENCY)
Dept: HOSPITAL 19 - COL.ER | Age: 68
Discharge: HOME | End: 2023-03-15
Attending: PERSONAL EMERGENCY RESPONSE ATTENDANT
Payer: MEDICARE

## 2023-03-15 VITALS — HEART RATE: 132 BPM | SYSTOLIC BLOOD PRESSURE: 105 MMHG | DIASTOLIC BLOOD PRESSURE: 54 MMHG

## 2023-03-15 VITALS — TEMPERATURE: 99.2 F

## 2023-03-15 DIAGNOSIS — Z87.891: ICD-10-CM

## 2023-03-15 DIAGNOSIS — C79.89: ICD-10-CM

## 2023-03-15 DIAGNOSIS — E86.0: ICD-10-CM

## 2023-03-15 DIAGNOSIS — C79.31: ICD-10-CM

## 2023-03-15 DIAGNOSIS — R09.02: ICD-10-CM

## 2023-03-15 DIAGNOSIS — Z28.310: ICD-10-CM

## 2023-03-15 DIAGNOSIS — C18.9: Primary | ICD-10-CM

## 2023-03-15 DIAGNOSIS — C78.00: ICD-10-CM

## 2023-03-15 DIAGNOSIS — L89.159: ICD-10-CM

## 2023-03-15 NOTE — NUR
was called down to the ED and was advised the family was ready
to pursue patient returning home with hospice services. BALTAZAR met with patient's
daughter, Aubree and her spouse. Aubree stated she feels they are ready to
make the transition to hospice as they can no longer control patient's pain.
Aubree's main priority is patient being comfortable and being at home. BALTAZAR
reviewed hospice agencies that serve [place] and Aubree would like
referrals sent to each of them to determine who would have availability today
and who would be able to best support patient's wound care needs. Aubree
would like to speak with the agencies to help her make a decision on who to
choose. Aubree also wanted to speak with hospice about patient continuing on
antibiotics to help manage his wound.
 
BALTAZAR contacted [facility], [facility], [facility], and [facility] and
faxed referral to each.
 
BALTAZAR received a follow up call from Aubree who advised she spoke with [facility] and wanted to choose them as hospice provider. Aguila SIMS went out to
the home to complete admission paperwork. BALTAZAR spoke with Aguila SIMS who advised
oxygen would not be delivered until about 1630 or 1700, however daughter
Aubree requested patient be transported home as soon as possible, even before
the oxygen would arrive. Aguila advised that this was their decision to make.
 
BALTAZAR contacted [facility], who was originally scheduled to provide
transport and they are still available this afternoon. [facility] Captain
thought that [facility] should be billed, however BALTAZAR spoke with Raul at
[facility] who advised they would not pay for transport as their services do not
begin until patient enters the home. BALTAZAR obtained estimated cost of transport,
$500 base + $15 per mile and provided it to Aubree. Aubree is agreeable to
this and was agreeable to return to the hospital to sign ABN form per [facility]'s request as they would not accept verbal consent. Aubree stated she
would be on her way back to the hospital and should be there within half an
hour. BALTAZAR again reminded Aubree that oxygen would not be delivered until 1630.
Aubree stated she was fine with this and still wanted patient transported
home. BALTAZAR notified Raul at [facility] that patient to be transported home
shortly. BALTAZAR notified House SupervisorEleazar that Roseline Moore and daughter
were in route to the ED.

## 2023-03-15 NOTE — NUR
met with patient's daughter and daughter's  and provided
education on hospice services and end of life care.  DaughterShana
continues to feel that she needs to see the MRI (scheduled for Friday) before
she can make the decision for end of life care.  Worker provided emotional
support.  Worker spoke with Wilfred at the [facility] and
confirmed that Dr Choudhary's office had given them a Hospice referral and that
their hospice house was full today, however they could admit for hospice care
in the home.  Worker collaborated with Dr Jameson regarding education and
support give to patient's daughter.  Later this date, daughter decided to
transition patient home with hospice care.  See Luz Covarrubias's notes for
further details and arrangements made for home with hospice this date.
